# Patient Record
Sex: MALE | Race: WHITE | Employment: OTHER | URBAN - METROPOLITAN AREA
[De-identification: names, ages, dates, MRNs, and addresses within clinical notes are randomized per-mention and may not be internally consistent; named-entity substitution may affect disease eponyms.]

---

## 2019-11-10 LAB — FECAL BLOOD IMMUNOCHEMICAL TEST: NEGATIVE

## 2022-07-21 ENCOUNTER — TELEPHONE (OUTPATIENT)
Dept: ORTHOPEDIC SURGERY | Age: 77
End: 2022-07-21

## 2022-07-21 NOTE — TELEPHONE ENCOUNTER
Nae from Dr Sera Yuen office phoned requesting Dr. Love Marin see patient asap for L comminuted displaced radial head fx a month old. Instructed to forward medical and have patient drop off imaging for review. Verbalized understanding.

## 2022-07-26 ENCOUNTER — OFFICE VISIT (OUTPATIENT)
Dept: ORTHOPEDIC SURGERY | Age: 77
End: 2022-07-26
Payer: OTHER GOVERNMENT

## 2022-07-26 ENCOUNTER — TELEPHONE (OUTPATIENT)
Dept: ORTHOPEDIC SURGERY | Age: 77
End: 2022-07-26

## 2022-07-26 VITALS — HEIGHT: 69 IN | RESPIRATION RATE: 20 BRPM | WEIGHT: 212 LBS | BODY MASS INDEX: 31.4 KG/M2

## 2022-07-26 DIAGNOSIS — M79.602 LEFT ARM PAIN: Primary | ICD-10-CM

## 2022-07-26 DIAGNOSIS — S52.122A CLOSED DISPLACED FRACTURE OF HEAD OF LEFT RADIUS, INITIAL ENCOUNTER: ICD-10-CM

## 2022-07-26 DIAGNOSIS — S52.122A: ICD-10-CM

## 2022-07-26 DIAGNOSIS — S63.015A: ICD-10-CM

## 2022-07-26 PROCEDURE — 99204 OFFICE O/P NEW MOD 45 MIN: CPT | Performed by: ORTHOPAEDIC SURGERY

## 2022-07-26 PROCEDURE — 1123F ACP DISCUSS/DSCN MKR DOCD: CPT | Performed by: ORTHOPAEDIC SURGERY

## 2022-07-26 RX ORDER — ISOSORBIDE MONONITRATE 30 MG/1
30 TABLET, EXTENDED RELEASE ORAL EVERY MORNING
COMMUNITY

## 2022-07-26 NOTE — TELEPHONE ENCOUNTER
Left voicemail for patient that Dr. Jefe Castro would like to see him in the office today. Office phone number provided.

## 2022-07-26 NOTE — PROGRESS NOTES
Department of Orthopedic Surgery  History and Physical      CHIEF COMPLAINT:  left wrist and elbow pain    HISTORY OF PRESENT ILLNESS:                The patient is a RHD 68 y.o. male who presents with left wrist and elbow pain. States a month ago he was out of state when he tripped and fell down a flight of steps. He went to the hospital he was found to have a radial head fracture. He was placed into a splint. Is been in a splint for the last month. He did see Dr. Rosalee Del Castillo who referred him here for further evaluation. He is reporting a lot of wrist pain also. Past Medical History:        Diagnosis Date    Hyperlipidemia     Hypertension      Past Surgical History:        Procedure Laterality Date    CORONARY ARTERY BYPASS GRAFT       Current Medications:   No current facility-administered medications for this visit. Allergies:  Benadryl [diphenhydramine] and Penicillins    Social History:   TOBACCO:   reports that he has never smoked. He has never used smokeless tobacco.  ETOH:   has no history on file for alcohol use. DRUGS:   reports no history of drug use.   ACTIVITIES OF DAILY LIVING:    OCCUPATION:    Family History:       Problem Relation Age of Onset    Heart Attack Brother     Heart Surgery Brother     Pacemaker Sister     Heart Surgery Sister     Heart Surgery Sister        REVIEW OF SYSTEMS:  CONSTITUTIONAL:  negative  EYES:  negative  HEENT:  negative  RESPIRATORY:  negative  CARDIOVASCULAR:  negative  GASTROINTESTINAL:  negative  GENITOURINARY:  negative  INTEGUMENT/BREAST:  negative  HEMATOLOGIC/LYMPHATIC:  negative  ALLERGIC/IMMUNOLOGIC:  negative  ENDOCRINE:  negative  MUSCULOSKELETAL:  left elbow and wrist pain  NEUROLOGICAL:  negative  BEHAVIOR/PSYCH:  negative    PHYSICAL EXAM:    VITALS:  Resp 20   Ht 5' 9\" (1.753 m)   Wt 212 lb (96.2 kg)   BMI 31.31 kg/m²   CONSTITUTIONAL:  awake, alert, cooperative, no apparent distress, and appears stated age  EYES:  Lids and lashes normal, pupils equal, round and reactive to light, extra ocular muscles intact, sclera clear, conjunctiva normal  ENT:  Normocephalic, without obvious abnormality, atraumatic, sinuses nontender on palpation, external ears without lesions, oral pharynx with moist mucus membranes, tonsils without erythema or exudates, gums normal and good dentition. NECK:  Supple, symmetrical, trachea midline, no adenopathy, thyroid symmetric, not enlarged and no tenderness, skin normal  LUNGS:  CTA  CARDIOVASCULAR:  2+ radial pulses, extremities warm and well perfused  ABDOMEN: NTTP  CHEST:  Atraumatic   GENITAL/URINARY:  deferred  NEUROLOGIC:  Awake, alert, oriented to name, place and time. Cranial nerves II-XII are grossly intact. Motor is 5 out of 5 bilaterally. Sensory is intact.  gait is normal.  MUSCULOSKELETAL:    Left upper extremity: skin intact. Pain with rotation of the elbow and wrist. Stiffness with flexion and extension of the wrist.  Positive tenderness over the DRUJ. Positive tenderness over the forearm. Positive tenderness over the distal radius. Negative tenderness over the snuffbox. Median, ulnar, radial nerves intact light touch. Brisk capillary refill. Otherwise neurovascular intact. DATA:    CBC: No results found for: WBC, RBC, HGB, HCT, MCV, MCH, MCHC, RDW, PLT, MPV  PT/INR:  No results found for: PROTIME, INR    Radiology Review:  Xray: x-rays of the left elbow were reviewed from 7/21/22 2 views: AP/lateral : demonstrate comminuted, displaced radial head fracture  Impression: Comminuted, displaced radial head fracture    X-rays of the left wrist were reviewed from 7/21/2022: AP, lateral, oblique demonstrate no acute fracture dislocation. Impression: No acute fractures or dislocations    Xray: x-rays of the left forearm were obtained today in the office and reviewed with the patient.  2 views: AP, lateral: demonstrate no acute fracture or dislocation at the wrist.  There is a comminuted displaced radial out from his initial date of injury. Discussed complexity of the situation with him in detail. All questions answered. I concur with the findings and plan as documented.     Savanna Patricio MD  7/26/2022

## 2022-07-28 ENCOUNTER — PREP FOR PROCEDURE (OUTPATIENT)
Dept: ORTHOPEDIC SURGERY | Age: 77
End: 2022-07-28

## 2022-07-28 RX ORDER — SODIUM CHLORIDE 9 MG/ML
INJECTION, SOLUTION INTRAVENOUS PRN
Status: CANCELLED | OUTPATIENT
Start: 2022-07-28

## 2022-07-28 RX ORDER — SODIUM CHLORIDE 0.9 % (FLUSH) 0.9 %
5-40 SYRINGE (ML) INJECTION EVERY 12 HOURS SCHEDULED
Status: CANCELLED | OUTPATIENT
Start: 2022-07-28

## 2022-07-28 RX ORDER — SODIUM CHLORIDE 9 MG/ML
INJECTION, SOLUTION INTRAVENOUS CONTINUOUS
Status: CANCELLED | OUTPATIENT
Start: 2022-07-28

## 2022-07-28 RX ORDER — SODIUM CHLORIDE 0.9 % (FLUSH) 0.9 %
5-40 SYRINGE (ML) INJECTION PRN
Status: CANCELLED | OUTPATIENT
Start: 2022-07-28

## 2022-08-01 NOTE — PROGRESS NOTES
[facility] PRE-ADMISSION TESTING INSTRUCTIONS    The Preadmission Testing patient is instructed accordingly using the following criteria (check applicable):    ARRIVAL INSTRUCTIONS:  [x] Parking the day of Surgery is located in the Main Entrance lot. Upon entering the door, make an immediate right to the surgery reception desk    [x] Bring photo ID and insurance card    [x] Bring in a copy of Living will or Durable Power of  papers. [x] Please be sure to arrange for responsible adult to provide transportation to and from the hospital    [x] Please arrange for responsible adult to be with you for the 24 hour period post procedure due to having anesthesia    [x] If you awake am of surgery not feeling well or have temperature >100 please call [phone]    GENERAL INSTRUCTIONS:    [x] Nothing by mouth after midnight, including gum, candy, mints or water    [x] You may brush your teeth, but do not swallow any water    [x] Take medications as instructed with 1-2 oz of water    [x] Stop herbal supplements and vitamins 5 days prior to procedure    [x] Follow preop dosing of blood thinners per physician instructions    [x] Shower or bath with soap, lather and rinse well, AM of Surgery, no lotion, powders or creams to surgical site    [x] No tobacco products within 24 hours of surgery     [x] No alcohol or illegal drug use within 24 hours of surgery.     [x] Jewelry, body piercing's, eyeglasses, contact lenses and dentures are not permitted into surgery (bring cases)      [x] Please do not wear any nail polish, make up or hair products on the day of surgery    [x] You can expect a call the business day prior to procedure to notify you if your arrival time changes    [x] If you receive a survey after surgery we would greatly appreciate your comments    [] Pediatric patients may bring favorite toy, blanket or comfort item with them    [x] Please notify surgeon if you develop any illness between now and time of surgery (cold, cough, sore throat, fever, nausea, vomiting) or any signs of infections  including skin, wounds, and dental.    [x]  The Outpatient Pharmacy is available to fill your prescription here on your day of surgery, ask your preop nurse for details

## 2022-08-01 NOTE — DISCHARGE INSTRUCTIONS
DISCHARGE INSTRUCTIONS TO HOME FOLLOWING SURGERY    Please change your follow up next Thursday! ACTIVITY INSTRUCTIONS:    Elevate extremity to help reduce swelling. Use Purple Pillow for elevation of hand/arm   Use sling as needed. No heavy lifting, pushing, pulling or strenuous activity    WOUND/DRESSING INSTRUCTIONS:  Always ensure you and your care giver clean hands before and after caring for the wound. Keep the dressing, cast, or splint clean and dry until seen in office. Do not remove dressing   OK to shower- Keep your dressing dry. Can ice surgical region to help reduce swelling, Do not apply ice to fingers or toes       MEDICATION INSTRUCTIONS:  Take pain medicine as directed. When taking pain medications, you may experience dizziness or drowsiness. Do not drink alcohol or drive when taking these medications. Do not take any other medication containing acetaminophen (Tylenol) while taking the prescribed pain medication. Ibuprofen, Aleve, Motrin, or Naproxen are okay but should not be taken on an empty stomach. *Watch for these significant complications:  Call physician if these or any other problems occur:  Fever over 101°, redness, swelling or warmth at the operative site  Unrelieved nausea    Foul smelling or cloudy drainage at the operative site   Unrelieved pain  Breathing issues such as shortness of breath or difficulty breathing    Blood soaked dressing. (Some oozing may be normal)     Numb, pale, blue, cold or tingling extremity       Make an appointment to be seen next week!!!  FOLLOW-UP CARE:    Dr. Saida Cool.  [facility] [address] [place] [address]  [phone]

## 2022-08-03 ENCOUNTER — HOSPITAL ENCOUNTER (OUTPATIENT)
Age: 77
Setting detail: OUTPATIENT SURGERY
Discharge: HOME | End: 2022-08-03
Attending: ORTHOPAEDIC SURGERY | Admitting: ORTHOPAEDIC SURGERY
Payer: OTHER GOVERNMENT

## 2022-08-03 ENCOUNTER — ANESTHESIA (OUTPATIENT)
Dept: OPERATING ROOM | Age: 77
End: 2022-08-03
Payer: OTHER GOVERNMENT

## 2022-08-03 ENCOUNTER — HOSPITAL ENCOUNTER (OUTPATIENT)
Dept: GENERAL RADIOLOGY | Age: 77
Setting detail: OUTPATIENT SURGERY
Discharge: HOME | End: 2022-08-05
Attending: ORTHOPAEDIC SURGERY
Payer: OTHER GOVERNMENT

## 2022-08-03 ENCOUNTER — ANESTHESIA EVENT (OUTPATIENT)
Dept: OPERATING ROOM | Age: 77
End: 2022-08-03
Payer: OTHER GOVERNMENT

## 2022-08-03 VITALS
SYSTOLIC BLOOD PRESSURE: 156 MMHG | HEART RATE: 98 BPM | OXYGEN SATURATION: 95 % | HEIGHT: 70 IN | DIASTOLIC BLOOD PRESSURE: 88 MMHG | BODY MASS INDEX: 30.35 KG/M2 | WEIGHT: 212 LBS | TEMPERATURE: 98.4 F | RESPIRATION RATE: 16 BRPM

## 2022-08-03 DIAGNOSIS — R52 PAIN: ICD-10-CM

## 2022-08-03 DIAGNOSIS — G89.18 POST-OPERATIVE PAIN: Primary | ICD-10-CM

## 2022-08-03 DIAGNOSIS — S52.122A CLOSED DISPLACED FRACTURE OF HEAD OF LEFT RADIUS, INITIAL ENCOUNTER: Primary | ICD-10-CM

## 2022-08-03 LAB
ANION GAP SERPL CALCULATED.3IONS-SCNC: 12 MMOL/L (ref 7–16)
BUN BLDV-MCNC: 12 MG/DL (ref 6–23)
CALCIUM SERPL-MCNC: 9.7 MG/DL (ref 8.6–10.2)
CHLORIDE BLD-SCNC: 106 MMOL/L (ref 98–107)
CO2: 25 MMOL/L (ref 22–29)
CREAT SERPL-MCNC: 1.1 MG/DL (ref 0.7–1.2)
EKG ATRIAL RATE: 60 BPM
EKG P AXIS: 42 DEGREES
EKG P-R INTERVAL: 168 MS
EKG Q-T INTERVAL: 418 MS
EKG QRS DURATION: 102 MS
EKG QTC CALCULATION (BAZETT): 418 MS
EKG R AXIS: 5 DEGREES
EKG T AXIS: 25 DEGREES
EKG VENTRICULAR RATE: 60 BPM
GFR AFRICAN AMERICAN: >60
GFR NON-AFRICAN AMERICAN: >60 ML/MIN/1.73
GLUCOSE BLD-MCNC: 103 MG/DL (ref 74–99)
HCT VFR BLD CALC: 42.6 % (ref 37–54)
HEMOGLOBIN: 14 G/DL (ref 12.5–16.5)
MCH RBC QN AUTO: 31 PG (ref 26–35)
MCHC RBC AUTO-ENTMCNC: 32.9 % (ref 32–34.5)
MCV RBC AUTO: 94.2 FL (ref 80–99.9)
PDW BLD-RTO: 13 FL (ref 11.5–15)
PLATELET # BLD: 179 E9/L (ref 130–450)
PMV BLD AUTO: 10.4 FL (ref 7–12)
POTASSIUM SERPL-SCNC: 3.9 MMOL/L (ref 3.5–5)
RBC # BLD: 4.52 E12/L (ref 3.8–5.8)
SODIUM BLD-SCNC: 143 MMOL/L (ref 132–146)
WBC # BLD: 4.8 E9/L (ref 4.5–11.5)

## 2022-08-03 PROCEDURE — 6360000002 HC RX W HCPCS: Performed by: ANESTHESIOLOGY

## 2022-08-03 PROCEDURE — 7100000011 HC PHASE II RECOVERY - ADDTL 15 MIN: Performed by: ORTHOPAEDIC SURGERY

## 2022-08-03 PROCEDURE — 2709999900 HC NON-CHARGEABLE SUPPLY: Performed by: ORTHOPAEDIC SURGERY

## 2022-08-03 PROCEDURE — 85027 COMPLETE CBC AUTOMATED: CPT

## 2022-08-03 PROCEDURE — 3700000001 HC ADD 15 MINUTES (ANESTHESIA): Performed by: ORTHOPAEDIC SURGERY

## 2022-08-03 PROCEDURE — 6370000000 HC RX 637 (ALT 250 FOR IP): Performed by: ANESTHESIOLOGY

## 2022-08-03 PROCEDURE — 3600000003 HC SURGERY LEVEL 3 BASE: Performed by: ORTHOPAEDIC SURGERY

## 2022-08-03 PROCEDURE — C1776 JOINT DEVICE (IMPLANTABLE): HCPCS | Performed by: ORTHOPAEDIC SURGERY

## 2022-08-03 PROCEDURE — 80048 BASIC METABOLIC PNL TOTAL CA: CPT

## 2022-08-03 PROCEDURE — C1713 ANCHOR/SCREW BN/BN,TIS/BN: HCPCS | Performed by: ORTHOPAEDIC SURGERY

## 2022-08-03 PROCEDURE — 7100000001 HC PACU RECOVERY - ADDTL 15 MIN: Performed by: ORTHOPAEDIC SURGERY

## 2022-08-03 PROCEDURE — 3700000000 HC ANESTHESIA ATTENDED CARE: Performed by: ORTHOPAEDIC SURGERY

## 2022-08-03 PROCEDURE — 24343 REPR ELBOW LAT LIGMNT W/TISS: CPT | Performed by: ORTHOPAEDIC SURGERY

## 2022-08-03 PROCEDURE — 7100000010 HC PHASE II RECOVERY - FIRST 15 MIN: Performed by: ORTHOPAEDIC SURGERY

## 2022-08-03 PROCEDURE — 6360000002 HC RX W HCPCS: Performed by: NURSE ANESTHETIST, CERTIFIED REGISTERED

## 2022-08-03 PROCEDURE — 93005 ELECTROCARDIOGRAM TRACING: CPT

## 2022-08-03 PROCEDURE — 2580000003 HC RX 258: Performed by: NURSE ANESTHETIST, CERTIFIED REGISTERED

## 2022-08-03 PROCEDURE — 6360000002 HC RX W HCPCS: Performed by: PHYSICIAN ASSISTANT

## 2022-08-03 PROCEDURE — 2500000003 HC RX 250 WO HCPCS: Performed by: ORTHOPAEDIC SURGERY

## 2022-08-03 PROCEDURE — 94664 DEMO&/EVAL PT USE INHALER: CPT

## 2022-08-03 PROCEDURE — 3209999900 FLUORO FOR SURGICAL PROCEDURES

## 2022-08-03 PROCEDURE — 7100000000 HC PACU RECOVERY - FIRST 15 MIN: Performed by: ORTHOPAEDIC SURGERY

## 2022-08-03 PROCEDURE — 2500000003 HC RX 250 WO HCPCS: Performed by: NURSE ANESTHETIST, CERTIFIED REGISTERED

## 2022-08-03 PROCEDURE — 24666 OPTX RADIAL HEAD/NCK FX RPLC: CPT | Performed by: ORTHOPAEDIC SURGERY

## 2022-08-03 PROCEDURE — 3600000013 HC SURGERY LEVEL 3 ADDTL 15MIN: Performed by: ORTHOPAEDIC SURGERY

## 2022-08-03 PROCEDURE — 2580000003 HC RX 258: Performed by: PHYSICIAN ASSISTANT

## 2022-08-03 PROCEDURE — 36415 COLL VENOUS BLD VENIPUNCTURE: CPT

## 2022-08-03 PROCEDURE — 24670 CLTX ULNAR FX PROX W/O MNPJ: CPT | Performed by: ORTHOPAEDIC SURGERY

## 2022-08-03 DEVICE — IMPLANTABLE DEVICE
Type: IMPLANTABLE DEVICE | Site: ARM | Status: FUNCTIONAL
Brand: EVOLVE

## 2022-08-03 DEVICE — ANCHOR SUT 2 ORTHOCORD L36IN VLT BLU COMP BRAID SUT CP-2: Type: IMPLANTABLE DEVICE | Site: ARM | Status: FUNCTIONAL

## 2022-08-03 RX ORDER — OXYCODONE HYDROCHLORIDE AND ACETAMINOPHEN 5; 325 MG/1; MG/1
1 TABLET ORAL EVERY 6 HOURS PRN
Qty: 28 TABLET | Refills: 0 | Status: SHIPPED | OUTPATIENT
Start: 2022-08-03 | End: 2022-08-10

## 2022-08-03 RX ORDER — ONDANSETRON 2 MG/ML
INJECTION INTRAMUSCULAR; INTRAVENOUS PRN
Status: DISCONTINUED | OUTPATIENT
Start: 2022-08-03 | End: 2022-08-03 | Stop reason: SDUPTHER

## 2022-08-03 RX ORDER — SODIUM CHLORIDE 9 MG/ML
INJECTION, SOLUTION INTRAVENOUS PRN
Status: DISCONTINUED | OUTPATIENT
Start: 2022-08-03 | End: 2022-08-03 | Stop reason: HOSPADM

## 2022-08-03 RX ORDER — SODIUM CHLORIDE 9 MG/ML
INJECTION, SOLUTION INTRAVENOUS CONTINUOUS PRN
Status: DISCONTINUED | OUTPATIENT
Start: 2022-08-03 | End: 2022-08-03 | Stop reason: SDUPTHER

## 2022-08-03 RX ORDER — ALBUTEROL SULFATE 2.5 MG/3ML
2.5 SOLUTION RESPIRATORY (INHALATION) ONCE
Status: COMPLETED | OUTPATIENT
Start: 2022-08-03 | End: 2022-08-03

## 2022-08-03 RX ORDER — LIDOCAINE HYDROCHLORIDE 20 MG/ML
INJECTION, SOLUTION EPIDURAL; INFILTRATION; INTRACAUDAL; PERINEURAL PRN
Status: DISCONTINUED | OUTPATIENT
Start: 2022-08-03 | End: 2022-08-03 | Stop reason: SDUPTHER

## 2022-08-03 RX ORDER — FENTANYL CITRATE 50 UG/ML
INJECTION, SOLUTION INTRAMUSCULAR; INTRAVENOUS PRN
Status: DISCONTINUED | OUTPATIENT
Start: 2022-08-03 | End: 2022-08-03 | Stop reason: SDUPTHER

## 2022-08-03 RX ORDER — SODIUM CHLORIDE 0.9 % (FLUSH) 0.9 %
5-40 SYRINGE (ML) INJECTION EVERY 12 HOURS SCHEDULED
Status: DISCONTINUED | OUTPATIENT
Start: 2022-08-03 | End: 2022-08-03 | Stop reason: HOSPADM

## 2022-08-03 RX ORDER — BUPIVACAINE HYDROCHLORIDE AND EPINEPHRINE 5; 5 MG/ML; UG/ML
INJECTION, SOLUTION EPIDURAL; INTRACAUDAL; PERINEURAL PRN
Status: DISCONTINUED | OUTPATIENT
Start: 2022-08-03 | End: 2022-08-03 | Stop reason: ALTCHOICE

## 2022-08-03 RX ORDER — PROCHLORPERAZINE EDISYLATE 5 MG/ML
5 INJECTION INTRAMUSCULAR; INTRAVENOUS
Status: DISCONTINUED | OUTPATIENT
Start: 2022-08-03 | End: 2022-08-03 | Stop reason: HOSPADM

## 2022-08-03 RX ORDER — OXYCODONE HYDROCHLORIDE AND ACETAMINOPHEN 5; 325 MG/1; MG/1
1 TABLET ORAL ONCE
Status: COMPLETED | OUTPATIENT
Start: 2022-08-03 | End: 2022-08-03

## 2022-08-03 RX ORDER — DEXAMETHASONE SODIUM PHOSPHATE 4 MG/ML
INJECTION, SOLUTION INTRA-ARTICULAR; INTRALESIONAL; INTRAMUSCULAR; INTRAVENOUS; SOFT TISSUE PRN
Status: DISCONTINUED | OUTPATIENT
Start: 2022-08-03 | End: 2022-08-03 | Stop reason: SDUPTHER

## 2022-08-03 RX ORDER — SODIUM CHLORIDE 0.9 % (FLUSH) 0.9 %
5-40 SYRINGE (ML) INJECTION PRN
Status: DISCONTINUED | OUTPATIENT
Start: 2022-08-03 | End: 2022-08-03 | Stop reason: HOSPADM

## 2022-08-03 RX ORDER — SODIUM CHLORIDE 9 MG/ML
INJECTION, SOLUTION INTRAVENOUS CONTINUOUS
Status: DISCONTINUED | OUTPATIENT
Start: 2022-08-03 | End: 2022-08-03 | Stop reason: HOSPADM

## 2022-08-03 RX ORDER — PROPOFOL 10 MG/ML
INJECTION, EMULSION INTRAVENOUS PRN
Status: DISCONTINUED | OUTPATIENT
Start: 2022-08-03 | End: 2022-08-03 | Stop reason: SDUPTHER

## 2022-08-03 RX ORDER — MEPERIDINE HYDROCHLORIDE 25 MG/ML
12.5 INJECTION INTRAMUSCULAR; INTRAVENOUS; SUBCUTANEOUS ONCE
Status: COMPLETED | OUTPATIENT
Start: 2022-08-03 | End: 2022-08-03

## 2022-08-03 RX ADMIN — ALBUTEROL SULFATE 2.5 MG: 2.5 SOLUTION RESPIRATORY (INHALATION) at 11:59

## 2022-08-03 RX ADMIN — DEXAMETHASONE SODIUM PHOSPHATE 10 MG: 4 INJECTION, SOLUTION INTRAMUSCULAR; INTRAVENOUS at 09:22

## 2022-08-03 RX ADMIN — SODIUM CHLORIDE: 9 INJECTION, SOLUTION INTRAVENOUS at 09:11

## 2022-08-03 RX ADMIN — OXYCODONE AND ACETAMINOPHEN 1 TABLET: 5; 325 TABLET ORAL at 12:12

## 2022-08-03 RX ADMIN — MEPERIDINE HYDROCHLORIDE 12.5 MG: 25 INJECTION, SOLUTION INTRAMUSCULAR; INTRAVENOUS; SUBCUTANEOUS at 10:50

## 2022-08-03 RX ADMIN — PROPOFOL 200 MG: 10 INJECTION, EMULSION INTRAVENOUS at 09:16

## 2022-08-03 RX ADMIN — FENTANYL CITRATE 100 MCG: 50 INJECTION, SOLUTION INTRAMUSCULAR; INTRAVENOUS at 09:16

## 2022-08-03 RX ADMIN — LIDOCAINE HYDROCHLORIDE 100 MG: 20 INJECTION, SOLUTION EPIDURAL; INFILTRATION; INTRACAUDAL; PERINEURAL at 09:16

## 2022-08-03 RX ADMIN — WATER 2000 MG: 1 INJECTION INTRAMUSCULAR; INTRAVENOUS; SUBCUTANEOUS at 09:11

## 2022-08-03 RX ADMIN — HYDROMORPHONE HYDROCHLORIDE 0.5 MG: 1 INJECTION, SOLUTION INTRAMUSCULAR; INTRAVENOUS; SUBCUTANEOUS at 11:03

## 2022-08-03 RX ADMIN — HYDROMORPHONE HYDROCHLORIDE 0.5 MG: 1 INJECTION, SOLUTION INTRAMUSCULAR; INTRAVENOUS; SUBCUTANEOUS at 11:08

## 2022-08-03 RX ADMIN — ONDANSETRON 4 MG: 2 INJECTION INTRAMUSCULAR; INTRAVENOUS at 09:22

## 2022-08-03 ASSESSMENT — PAIN - FUNCTIONAL ASSESSMENT
PAIN_FUNCTIONAL_ASSESSMENT: ACTIVITIES ARE NOT PREVENTED
PAIN_FUNCTIONAL_ASSESSMENT: 0-10

## 2022-08-03 ASSESSMENT — PAIN DESCRIPTION - ORIENTATION
ORIENTATION: LEFT

## 2022-08-03 ASSESSMENT — PAIN DESCRIPTION - LOCATION
LOCATION: ELBOW
LOCATION: ARM
LOCATION: ELBOW
LOCATION: ARM
LOCATION: ARM;HAND
LOCATION: ELBOW
LOCATION: ARM
LOCATION: ARM
LOCATION: ELBOW

## 2022-08-03 ASSESSMENT — PAIN DESCRIPTION - DESCRIPTORS
DESCRIPTORS: THROBBING
DESCRIPTORS: THROBBING
DESCRIPTORS: DISCOMFORT
DESCRIPTORS: THROBBING
DESCRIPTORS: DISCOMFORT
DESCRIPTORS: SHARP;THROBBING
DESCRIPTORS: DISCOMFORT
DESCRIPTORS: DULL;THROBBING

## 2022-08-03 ASSESSMENT — PAIN DESCRIPTION - ONSET
ONSET: ON-GOING

## 2022-08-03 ASSESSMENT — PAIN DESCRIPTION - PAIN TYPE
TYPE: SURGICAL PAIN

## 2022-08-03 ASSESSMENT — PAIN DESCRIPTION - FREQUENCY
FREQUENCY: CONTINUOUS

## 2022-08-03 ASSESSMENT — PAIN SCALES - GENERAL
PAINLEVEL_OUTOF10: 10
PAINLEVEL_OUTOF10: 4
PAINLEVEL_OUTOF10: 7
PAINLEVEL_OUTOF10: 4
PAINLEVEL_OUTOF10: 5
PAINLEVEL_OUTOF10: 0
PAINLEVEL_OUTOF10: 5
PAINLEVEL_OUTOF10: 5
PAINLEVEL_OUTOF10: 8
PAINLEVEL_OUTOF10: 6
PAINLEVEL_OUTOF10: 6

## 2022-08-03 ASSESSMENT — PAIN SCALES - WONG BAKER: WONGBAKER_NUMERICALRESPONSE: 2

## 2022-08-03 NOTE — ANESTHESIA POSTPROCEDURE EVALUATION
Department of Anesthesiology  Postprocedure Note    Patient: Tierra Mcqueen  MRN: [id number]  YOB: 1945  Date of evaluation: 8/3/2022      Procedure Summary     Date: 08/03/22 Room / Location: [facility] OR 05 / [facility]    Anesthesia Start: 2106 Anesthesia Stop: 1042    Procedure: LEFT RADIAL HEAD ARTHROPLASTY WITH LIGAMENT REPAIR RADIAL ULNAR JOINT (Left: Arm Upper) Diagnosis:       Closed nondisplaced fracture of head of left radius, initial encounter      Dislocation of distal radioulnar joint of left wrist, initial encounter      (Closed nondisplaced fracture of head of left radius, initial encounter [S52.125A])      (Dislocation of distal radioulnar joint of left wrist, initial encounter [S38.846W])    Surgeons: Crissy Robles MD Responsible Provider: Adelaida Sanabria MD    Anesthesia Type: general ASA Status: 3          Anesthesia Type: No value filed.     Joceline Phase I: Joceline Score: 10    Joceline Phase II:        Anesthesia Post Evaluation    Patient location during evaluation: PACU  Patient participation: complete - patient participated  Level of consciousness: awake and alert  Pain score: 2  Airway patency: patent  Nausea & Vomiting: no nausea and no vomiting  Complications: no  Cardiovascular status: hemodynamically stable  Respiratory status: acceptable

## 2022-08-03 NOTE — BRIEF OP NOTE
Brief Postoperative Note      Patient: Stephan John  YOB: 1945  MRN: [id number]    Date of Procedure: 8/3/2022    Pre-Op Diagnosis: Closed nondisplaced fracture of head of left radius, initial encounter [S52.125A]  Dislocation of distal radioulnar joint of left wrist, initial encounter [Y99.197X]    Post-Op Diagnosis: Same       Procedure(s):  LEFT RADIAL HEAD ARTHROPLASTY WITH LIGAMENT REPAIR RADIAL ULNAR JOINT    Surgeon(s):  Yoanna Braun MD    Assistant:  Physician Assistant: DAVIDE Wilcox  Resident: Rossy Paredes DO    Anesthesia: General    Estimated Blood Loss (mL): Minimal    Complications: None    Specimens:   * No specimens in log *    Implants:  Implant Name Type Inv.  Item Serial No.  Lot No. LRB No. Used Action   HEAD RAD [id number] +4MM OFFSET CO CHROM STD BILAT PRSS FIT - [id number]  HEAD RAD [id number] +4MM OFFSET CO CHROM STD BILAT PRSS FIT  [employer] exsulin [facility]- [id number] Left 1 Implanted   STEM FNGR JT SZ 0 PROX INTERPHALANGEAL ROBERTO CARLOS [facility] - [id number]  STEM FNGR JT SZ 0 PROX INTERPHALANGEAL ROBERTO CARLOS [facility] exsulin [facility]- [id number] Left 1 Implanted   ANCHOR SUT 2 ORTHOCORD L36IN VLT NICHOLAS COMP BRAID SUT CP-2 - [id number]  ANCHOR SUT 2 ORTHOCORD L36IN VLT NICHOLAS COMP BRAID SUT CP-2  [employer]- [id number] Left 1 Implanted         Drains: * No LDAs found *    Findings: see op note    Electronically signed by DAVIDE Wilcox on 8/3/2022 at 10:41 AM

## 2022-08-03 NOTE — H&P
Department of Orthopedic Surgery  History and Physical      CHIEF COMPLAINT:  left wrist and elbow pain    HISTORY OF PRESENT ILLNESS:                The patient is a RHD 68 y.o. male who presents with left wrist and elbow pain. States a month ago he was out of state when he tripped and fell down a flight of steps. He went to the hospital he was found to have a radial head fracture. He was placed into a splint. Is been in a splint for the last month. He did see Dr. Sarah Menendez who referred him here for further evaluation. He is reporting a lot of wrist pain also. Past Medical History:        Diagnosis Date    Arthritis     CAD (coronary artery disease) 2017    Cardiogist Dr Ruel Joseph in [place] / last checkup 6/2022 - pt states no change in care at this visit    Fall 06/28/2022    fell down steps - pt states he was in the dark in an unfamiliar house - no complaints of dizziness prior to fall    Hyperlipidemia     Hypertension     Left radial head fracture 06/28/2022    fell down steps    GAGE on CPAP     pt does not know the setting     Past Surgical History:        Procedure Laterality Date    COLONOSCOPY      CORONARY ARTERY BYPASS GRAFT  2017    At [facility]     Current Medications:   Current Facility-Administered Medications: 0.9 % sodium chloride infusion, , IntraVENous, Continuous  sodium chloride flush 0.9 % injection 5-40 mL, 5-40 mL, IntraVENous, 2 times per day  sodium chloride flush 0.9 % injection 5-40 mL, 5-40 mL, IntraVENous, PRN  0.9 % sodium chloride infusion, , IntraVENous, PRN  ceFAZolin (ANCEF) 2,000 mg in sterile water 20 mL IV syringe, 2,000 mg, IntraVENous, On Call to OR  Allergies:  Benadryl [diphenhydramine] and Penicillins    Social History:   TOBACCO:   reports that he has never smoked. He has never used smokeless tobacco.  ETOH:   reports no history of alcohol use. DRUGS:   has no history on file for drug use.   ACTIVITIES OF DAILY LIVING:    OCCUPATION:    Family History:       Problem Relation Age of Onset    Heart Attack Brother     Heart Surgery Brother     Pacemaker Sister     Heart Surgery Sister     Heart Surgery Sister        REVIEW OF SYSTEMS:  CONSTITUTIONAL:  negative  EYES:  negative  HEENT:  negative  RESPIRATORY:  negative  CARDIOVASCULAR:  negative  GASTROINTESTINAL:  negative  GENITOURINARY:  negative  INTEGUMENT/BREAST:  negative  HEMATOLOGIC/LYMPHATIC:  negative  ALLERGIC/IMMUNOLOGIC:  negative  ENDOCRINE:  negative  MUSCULOSKELETAL:  left elbow and wrist pain  NEUROLOGICAL:  negative  BEHAVIOR/PSYCH:  negative    PHYSICAL EXAM:    VITALS:  BP (!) 146/81   Pulse 69   Temp 97.1 °F (36.2 °C)   Resp 18   Ht 5' 9.5\" (1.765 m)   Wt 212 lb (96.2 kg)   SpO2 95%   BMI 30.86 kg/m²   CONSTITUTIONAL:  awake, alert, cooperative, no apparent distress, and appears stated age  EYES:  Lids and lashes normal, pupils equal, round and reactive to light, extra ocular muscles intact, sclera clear, conjunctiva normal  ENT:  Normocephalic, without obvious abnormality, atraumatic, sinuses nontender on palpation, external ears without lesions, oral pharynx with moist mucus membranes, tonsils without erythema or exudates, gums normal and good dentition. NECK:  Supple, symmetrical, trachea midline, no adenopathy, thyroid symmetric, not enlarged and no tenderness, skin normal  LUNGS:  CTA  CARDIOVASCULAR:  2+ radial pulses, extremities warm and well perfused  ABDOMEN: NTTP  CHEST:  Atraumatic   GENITAL/URINARY:  deferred  NEUROLOGIC:  Awake, alert, oriented to name, place and time. Cranial nerves II-XII are grossly intact. Motor is 5 out of 5 bilaterally. Sensory is intact.  gait is normal.  MUSCULOSKELETAL:    Left upper extremity: skin intact. Pain with rotation of the elbow and wrist. Stiffness with flexion and extension of the wrist.  Positive tenderness over the DRUJ. Positive tenderness over the forearm. Positive tenderness over the distal radius.   Negative tenderness over the snuffbox. Median, ulnar, radial nerves intact light touch. Brisk capillary refill. Otherwise neurovascular intact. DATA:    CBC: No results found for: WBC, RBC, HGB, HCT, MCV, MCH, MCHC, RDW, PLT, MPV  PT/INR:  No results found for: PROTIME, INR    Radiology Review:  Xray: x-rays of the left elbow were reviewed from 7/21/22 2 views: AP/lateral : demonstrate comminuted, displaced radial head fracture  Impression: Comminuted, displaced radial head fracture    X-rays of the left wrist were reviewed from 7/21/2022: AP, lateral, oblique demonstrate no acute fracture dislocation. Impression: No acute fractures or dislocations    Xray: x-rays of the left forearm were obtained today in the office and reviewed with the patient. 2 views: AP, lateral: demonstrate no acute fracture or dislocation at the wrist.  There is a comminuted displaced radial head fracture  Impression: Comminuted displaced radial head fracture      IMPRESSION:  Closed, Left comminuted radial head fracture  Left wrist pain  HTN  Hyperlipidemia    PLAN:  Discussed findings with the patient at today's visit. Discussed conservative and surgical management with the patient. Recommended surgical management. Did discuss he has a lot of pain in his wrist at today's visit. There is concern that he injured his ligament between the radius and ulna. We will plan for a left radial head ORIF versus arthroplasty with ligament repairs and left wrist DRUJ repairs as needed. Postoperative course explained the patient. Patient like to proceed. All questions answered.     I explained the risks, benefits, alternatives and complications of surgery with the patient including but not limited to the risks of death, possible damage to nerves, vessels, or tendons, possible infection, possible nonunion, possible malunion, possible hardware failure, possible need for hardware removal, stiffness, as well as the possible need further surgery and unanticipated complications. The patient voiced understanding and all questions were answered. The patient elected to proceed with surgical intervention. The patient was counseled at length about the risks of sundeep Covid-19 during their perioperative period and any recovery window from their procedure. The patient was made aware that sundeep Covid-19  may worsen their prognosis for recovering from their procedure  and lend to a higher morbidity and/or mortality risk. All material risks, benefits, and reasonable alternatives including postponing the procedure were discussed. The patient does wish to proceed with the procedure at this time.      Philly Watts,   8/3/2022

## 2022-08-03 NOTE — ANESTHESIA PRE PROCEDURE
Department of Anesthesiology  Preprocedure Note       Name:  Marc Good   Age:  68 y.o.  :  1945                                          MRN:  [id number]         Date:  8/3/2022      Surgeon: Kevan Regalado):  Karon Stauffer MD    Procedure: Procedure(s):  LEFT RADIAL HEAD OPEN REDUCTION INTERNAL FIXATION VS ARTHROPLASTY WITH LIGAMENT REPAIR POSSIBLE REPAIR DISTAL RADIAL ULNAR JOINT   +++SYNTHES+++   ++BLOOM MEDICAL++    Medications prior to admission:   Prior to Admission medications    Medication Sig Start Date End Date Taking? Authorizing Provider   METOPROLOL SUCCINATE PO Take 25 mg by mouth at bedtime   Yes Historical Provider, MD   Multiple Vitamin (MULTIVITAMIN ADULT PO) Take by mouth daily   Yes Historical Provider, MD   isosorbide mononitrate (IMDUR) 30 MG extended release tablet Take 30 mg by mouth every morning    Historical Provider, MD   atorvastatin (LIPITOR) 20 MG tablet Take 40 mg by mouth nightly    Historical Provider, MD   Cholecalciferol (VITAMIN D) 2000 units CAPS capsule Take by mouth    Historical Provider, MD   aspirin 81 MG chewable tablet Take 81 mg by mouth daily  Patient not taking: No sig reported    Historical Provider, MD       Current medications:    Current Facility-Administered Medications   Medication Dose Route Frequency Provider Last Rate Last Admin    0.9 % sodium chloride infusion   IntraVENous Continuous DAVIDE Valero        sodium chloride flush 0.9 % injection 5-40 mL  5-40 mL IntraVENous 2 times per day DAVIDE Valero        sodium chloride flush 0.9 % injection 5-40 mL  5-40 mL IntraVENous PRN DAVIDE Valero        0.9 % sodium chloride infusion   IntraVENous PRN DAVIDE Valero        ceFAZolin (ANCEF) 2,000 mg in sterile water 20 mL IV syringe  2,000 mg IntraVENous On Call to [address]           Allergies:     Allergies   Allergen Reactions    Benadryl [Diphenhydramine] Rash    Penicillins Rash       Problem List:  There is no problem list on file for this patient. Past Medical History:        Diagnosis Date    Arthritis     CAD (coronary artery disease) 2017    Cardiogist Dr Jayant Edmond in [place] / last checkup 6/2022 - pt states no change in care at this visit    Fall 06/28/2022    fell down steps - pt states he was in the dark in an unfamiliar house - no complaints of dizziness prior to fall    Hyperlipidemia     Hypertension     Left radial head fracture 06/28/2022    fell down steps    GAGE on CPAP     pt does not know the setting       Past Surgical History:        Procedure Laterality Date    COLONOSCOPY      CORONARY ARTERY BYPASS GRAFT  2017    At [facility]       Social History:    Social History     Tobacco Use    Smoking status: Never    Smokeless tobacco: Never   Substance Use Topics    Alcohol use: Never                                Counseling given: Not Answered      Vital Signs (Current):   Vitals:    08/01/22 1450 08/03/22 0753   BP:  (!) 146/81   Pulse:  69   Resp:  18   Temp:  97.1 °F (36.2 °C)   SpO2:  95%   Weight: 212 lb (96.2 kg) 212 lb (96.2 kg)   Height: 5' 9.5\" (1.765 m) 5' 9.5\" (1.765 m)                                              BP Readings from Last 3 Encounters:   08/03/22 (!) 146/81   10/25/17 131/80       NPO Status:                                                                                 BMI:   Wt Readings from Last 3 Encounters:   08/03/22 212 lb (96.2 kg)   07/26/22 212 lb (96.2 kg)   11/06/17 216 lb 9.6 oz (98.2 kg)     Body mass index is 30.86 kg/m². CBC: No results found for: WBC, RBC, HGB, HCT, MCV, RDW, PLT    CMP: No results found for: NA, K, CL, CO2, BUN, CREATININE, GFRAA, AGRATIO, LABGLOM, GLUCOSE, GLU, PROT, CALCIUM, BILITOT, ALKPHOS, AST, ALT    POC Tests: No results for input(s): POCGLU, POCNA, POCK, POCCL, POCBUN, POCHEMO, POCHCT in the last 72 hours.     Coags: No results found for: PROTIME, INR, APTT    HCG (If Applicable): No results found for: PREGTESTUR, PREGSERUM, HCG, HCGQUANT     ABGs: No results found for: PHART, PO2ART, HOI0MRT, ODC4BYU, BEART, V0JNRLEG     Type & Screen (If Applicable):  No results found for: LABABO, LABRH    Drug/Infectious Status (If Applicable):  No results found for: HIV, HEPCAB    COVID-19 Screening (If Applicable): No results found for: COVID19        Anesthesia Evaluation  Patient summary reviewed and Nursing notes reviewed no history of anesthetic complications:   Airway: Mallampati: III  TM distance: >3 FB   Neck ROM: full  Mouth opening: > = 3 FB   Dental:          Pulmonary: breath sounds clear to auscultation  (+) sleep apnea: on CPAP,                             Cardiovascular:  Exercise tolerance: good (>4 METS),   (+) hypertension:, CAD:, CABG/stent: no interval change,         Rhythm: regular  Rate: normal                 ROS comment: Saw his cardiologist June 2022 - no changes - follow up in 1 year     Neuro/Psych:   Negative Neuro/Psych ROS              GI/Hepatic/Renal: Neg GI/Hepatic/Renal ROS            Endo/Other: Negative Endo/Other ROS                    Abdominal:             Vascular: negative vascular ROS. Other Findings:           Anesthesia Plan      general     ASA 3       Induction: intravenous. MIPS: Postoperative opioids intended and Prophylactic antiemetics administered. Anesthetic plan and risks discussed with patient. Plan discussed with CRNA. Steve Pike MD   8/3/2022          Patient seen and chart reviewed. No interval change in history or exam.   Anesthesia plan discussed, risk/benefits addressed. Patient's concerns and questions answered.      SAM Sawyer - CRNA  August 3, 2022  8:25 AM        Orville Null MD

## 2022-08-03 NOTE — PROGRESS NOTES
CLINICAL PHARMACY NOTE: MEDS TO BEDS    Total # of Prescriptions Filled: 1   The following medications were delivered to the patient:  Percocet 5-325 mg    Additional Documentation:

## 2022-08-04 NOTE — OP NOTE
[address] [address]                                OPERATIVE REPORT    PATIENT NAME: Froy Longo                      :        1945  MED REC NO:   [id number]                            ROOM:  ACCOUNT NO:   [de-identified]                           ADMIT DATE: 2022  PROVIDER:     Adina Worley MD    DATE OF PROCEDURE:  2022    PREOPERATIVE DIAGNOSES:  Left closed displaced comminuted radial head  fracture and lateral ulnar collateral ligament injury. POSTOPERATIVE DIAGNOSES:  1. Left comminuted displaced radial head fracture. 2.  Left type 2 coronoid fracture. 3.  Lateral ulnar collateral ligamentous complex. 4.  Left elbow terrible triad fracture. PROCEDURES PERFORMED:  1. Left radial head arthroplasty using the [facility] size +2,  8.5-mm stem with a size +4, 24-mm radial head. 2.  Nonoperative management of type 2 coronoid fracture. 3.  Left elbow lateral ulnar collateral ligament repair. 4.  Use of intraoperative fluoroscopy with a radial pull test negative  for forearm axis instability. 5.  Lateral ulnar collateral ligament repair using the Mitek anchor. SURGEON:  Adina Worley M.D. ANESTHESIA:  1. General.  2.  Local anesthetic by surgeon consisting of approximately 20 mL of  0.25% Marcaine with epinephrine. ASSISTANTS:  1. Joan Pablo DO, orthopedic surgery resident. 2.  Jaime Miller physician assistant certified. She was present  throughout the procedure. Her assistance was used for preoperative  positioning, intraoperative retraction, closure, and dressing  application. Her assistance expedited the case and decreased the  surgical time. TOTAL TOURNIQUET TIME:  Approximately 39 minutes. ESTIMATED BLOOD LOSS:  Minimal.    FINDINGS:  1. Severely comminuted and incarcerated radial head with articular  depression.   2.  There was significant stiffness about the elbow. This was improved  with gentle range of motion of the elbow and joint debridement. 3.  Intraoperative radial pull test, status post radial head resection,  revealed no forearm axis instability. 4.  There was compromise of the lateral ulnar collateral ligament that  was amenable to repair with a Mitek Super Perry. 5.  Status post radial head arthroplasty and ligament repair,  intraoperative fluoroscopy was used to confirm excellent reduction and  stability to the radiocapitellar joint and ulnar humeral joint without  significant narrowing of the medial clear space on the AP view and  excellent alignment and capture of the radiocapitellar joint and  alignment with full elbow range of motion. COMPLICATIONS:  None. SPECIMEN:  Radial head was sent for gross pathology. DISPOSITION:  The patient remained stable throughout the procedure. OPERATIVE INDICATIONS:  The patient is a 44-year-old gentleman who  sustained a left radial head injury, presented to the office in a  delayed fashion with persistent elbow pain and stiffness and a  comminuted radial head fracture. The severity of his injury was  explained. Risks, benefits, alternatives, and complication of surgery  were explained. After discussion, he wished to proceed with surgical  intervention. Risks included, but not limited to the risk of infection,  damage to nerves, vessels, or tendons, failure to improve his symptoms  or worsening of symptoms, recurrence of symptoms, persistent stiffness,  need for therapy, and possible need for additional surgery as well as  unforeseen complications. He voiced understanding and wished to proceed  with surgical intervention. DESCRIPTION OF PROCEDURE:  The patient was identified in the holding  area. The elbow was identified as the surgical site.   He was then seen  by Anesthesia, taken to the operating room, placed supine on the  operating table, and underwent general anesthesia per Anesthesia  Department. All bony prominences were well padded. A well-padded arm  tourniquet was placed. The left upper extremity was prepared and draped  in the standard sterile fashion. Arm was exsanguinated. Tourniquet  inflated to 250 mmHg. Total tourniquet time was approximately 39  minutes. Fluoroscopy was then brought in and used to confirm a comminuted radial  head fracture. A direct lateral approach to the elbow was undertaken centered on  lateral epicondyle extending distally in the mid axis of the radius and  proximally alone the lateral epicondylar ridge. Dissection was carried  down to the fascia with hemostasis achieved with electrocautery. Incision was then made along the lateral epicondylar ridge reflecting  the anterior soft tissues anteriorly. A large hematoma was encountered. Dissection was performed distally with forearm in pronation dividing the  lateral soft tissues in the mid axis. A comminuted radial head fracture  was encountered. This was completely displaced. There was comminution  and impaction of the fracture site and the head was completely  disassociated. A large head fragment was removed confirming significant  articular impaction, which precluded direct anatomic repair. Therefore,  this head piece was removed. Soft tissue protection was placed around  the neck of the radius and the radius as re-cut, flushed just distal to  the proximal radioulnar joint. With this completed, sequential  broaching was undertaken for right radial head arthroplasty. This was  taken up to a size 8.5.  A +2 extension was selected based on the  fracture pattern and a +4, 24-mm diameter head, as 26 was too large, +2  offset was trialed first _____ +4. This provided excellent capture of  the joint line on the AP view without overstuffing the medial joint  space. On the lateral view, the radiocapitellar joint was nicely  aligned. This was removed.   Fluoroscopy was then placed at the wrist  and a gentle radial pull test revealed no forearm axis instability. Therefore, the wound was copiously irrigated out. The wound was further inspected and there was found to be a type 2  coronoid fracture. This was a little bit more than a type 1, but did  involve the articular surface and felt to be more consistent with type  2, but did not provide any significant instability. This fracture was  gently debrided, but was not fixated. Attention was directed towards the lateral ulnar collateral ligament  repair. A Mitek anchor was placed. The lateral ulnar collateral  ligamentous complex was then reefed with one of the suture arms with the  elbow in a reduced position and supination. Status post implantation of  the radial head arthroplasty, which was then confirmed under fluoroscopy  to have good capture and alignment. With the lateral ligamentous  complex repaired, the remaining soft tissues were repaired with the  other suture arm of lateral complex _____ elbow. This was followed by  copious irrigation and skin closure in a layered fashion. Sterile  dressing and splint was applied.         Kristeen Aase, MD    D: 08/03/2022 15:24:44       T: 08/03/2022 15:29:52     [email or web address]  Job#: [id number]     Doc#: [id number]    CC:

## 2022-08-10 ENCOUNTER — TELEPHONE (OUTPATIENT)
Dept: ORTHOPEDIC SURGERY | Age: 77
End: 2022-08-10

## 2022-08-10 DIAGNOSIS — S52.122A CLOSED DISPLACED FRACTURE OF HEAD OF LEFT RADIUS, INITIAL ENCOUNTER: Primary | ICD-10-CM

## 2022-08-11 ENCOUNTER — OFFICE VISIT (OUTPATIENT)
Dept: ORTHOPEDIC SURGERY | Age: 77
End: 2022-08-11

## 2022-08-11 VITALS — WEIGHT: 210 LBS | HEIGHT: 69 IN | BODY MASS INDEX: 31.1 KG/M2

## 2022-08-11 DIAGNOSIS — S52.122A CLOSED DISPLACED FRACTURE OF HEAD OF LEFT RADIUS, INITIAL ENCOUNTER: Primary | ICD-10-CM

## 2022-08-11 PROCEDURE — 99024 POSTOP FOLLOW-UP VISIT: CPT | Performed by: ORTHOPAEDIC SURGERY

## 2022-08-11 NOTE — PROGRESS NOTES
Days postop left radial head arthroplasty with lateral ulnar collateral ligament repair. Overall is doing well. No complaints. Physical exam: Splint was taken down. Incisions healing nicely. No signs of infection. Able to flex the elbow up to about 130 degrees. Full pronation. Supination limited about 20 degrees. Elbow extension -30. He is neurovascular intact. X-rays in the office today: AP and lateral views of the left elbow demonstrate radial head arthroplasty with good alignment. No new fractures or dislocations. Suture anchor over the lateral ulnar collateral ligamentous complex in good position. Impression office x-rays: Stable left radial head arthroplasty and ligament repair. Assessment 8 days postop doing well    Plan    Home exercise program including elbow extension and supination exercises were explained and demonstrated. A removable posterior elbow splint was fashioned for him incorporating the wrist.  Patient was referred to therapy to improve elbow range of motion. May wean out of the splint for weeks postop. May incorporate light strengthening at 6 weeks. Modalities as needed. Follow-up in 4 weeks.

## 2022-08-16 ENCOUNTER — EVALUATION (OUTPATIENT)
Dept: OCCUPATIONAL THERAPY | Age: 77
End: 2022-08-16
Payer: OTHER GOVERNMENT

## 2022-08-16 DIAGNOSIS — S52.122A CLOSED DISPLACED FRACTURE OF HEAD OF LEFT RADIUS, INITIAL ENCOUNTER: Primary | ICD-10-CM

## 2022-08-16 PROCEDURE — 97530 THERAPEUTIC ACTIVITIES: CPT | Performed by: OCCUPATIONAL THERAPIST

## 2022-08-16 PROCEDURE — 97165 OT EVAL LOW COMPLEX 30 MIN: CPT | Performed by: OCCUPATIONAL THERAPIST

## 2022-08-16 PROCEDURE — 97140 MANUAL THERAPY 1/> REGIONS: CPT | Performed by: OCCUPATIONAL THERAPIST

## 2022-08-16 NOTE — PROGRESS NOTES
OCCUPATIONAL THERAPY INITIAL EVALUATION    [address]  [facility] OCCUPATIONAL THERAPY  [address] [place] [facility] [address]  Dept: [phone]  Loc: [address] [place], [place] [address] OT Fax: [phone]    Date:  2022  Initial Evaluation Date: 22   Evaluating Therapist: Kristel White OT    Patient Name:  Arturo Fuentes    :  1945    Restrictions/Precautions:  ROM, supination and extension focus, brace until 4 weeks post op, low fall risk  Diagnosis:  S52.122A (ICD-10-CM) - Closed displaced fracture of head of left radius, initial encounter     Date of Surgery: 8/3/22 sx    Insurance/Certification information:  Vaccn [employer]  Plan of care signed (Y/N): N  Visit# / total visits:     Referring Practitioner:  Rita Burns MD  Specific Practitioner Orders: Left elbow rom, focus on supination and extension, wean from brace at 4 weeks post op, modalities prn, Scar desensitization and management.     Assessment of current deficits   [] Functional mobility  [x] ADLs  [x] Strength   [] Cognition   [] Functional transfers   [x] IADLs  [] Safety Awareness  [x] Endurance   [x] Fine Motor Coordination  [] Balance  [] Vision/perception  [] Sensation    [] Gross Motor Coordination [x] ROM  [x] Pain   [x] Edema    [x] Scar Adhesion/Skin Integrity     OT PLAN OF CARE   OT POC based on physician orders, patient diagnosis and results of clinical assessment    Frequency/Duration 2-3x a week for 18 sessions  Specific OT Treatment to include:     [x] Instruction in HEP                   Modalities:  [x] Therapeutic Exercise        [x] Ultrasound               [] Electrical Stimulation/Attended  [x] PROM/Stretching                    [x] Fluidotherapy          [x]  Paraffin                   [x] AAROM  [x] AROM                 [] Iontophoresis:   [x] Tendon Glides                                               [] Neuromuscular Re-Ed            [] ADL/IADL re-training    [x] Therapeutic Activity       [x] Pain Management with/without modalities PRN                 [x] Manual Therapy                      [x] Splinting                      [x] Scar Management                   []Joint Protection/Training  []Ergonomics                             [x] Joint Mobilization        [] Adaptive Equipment Assessment/Training                             [x] Manual Edema Mobilization   [] Myofascial Release                 [] Energy Conservation/Work Simplification  [x] GM/FM Coordination       [] Safety retraining/education per  individual diagnosis/goals  [] Desensitization       Patient Specific Goal: to regain function in L arm to be able to go to [facility] in october                             GOALS (Long term same as Short term):  1) Patient will demonstrate good understanding of home program (exercises/activities/diagnosis/prognosis/goals) with good accuracy. 2) Patient will demonstrate increased active/passive range of motion of their LUE to [facility] for ADL/IADL completion to be able to increase completion in dressing tasks, socks and holding utensils to cut his food. 3) Patient will demonstrate increased /pinch strength of at least 10 / 5 pinch pounds of their L hand to be able to cut his lawn with the . 4) Patient to report decreased pain in their affected L upper extremity from 8/10 to 3/10 or less with resistive functional use. 5) Increase in fine motor function as evidenced by decreased time to complete 9-hole peg test and/or MRMT test by at least 5-10 seconds. 6) Patient to report 100% compliance with their splint wear, care, and precautions if needed. 7) Patient will be knowledgeable of edema control techniques as evident with decreases from moderate to mild/none. 8) Patient will demonstrate a non-tender/non-adherent scar.    9) Patient will decrease QuickDASH score to 20% or less for increased participation in daily functional activities. Past Medical History:   Past Medical History:   Diagnosis Date    Arthritis     CAD (coronary artery disease) 2017    Cardiogist Dr Jerrel Epley in [facility] [employer] / last checkup 6/2022 - pt states no change in care at this visit    Fall 06/28/2022    fell down steps - pt states he was in the dark in an unfamiliar house - no complaints of dizziness prior to fall    Hyperlipidemia     Hypertension     Left radial head fracture 06/28/2022    fell down steps    GAGE on CPAP     pt does not know the setting     Past Surgical History:   Past Surgical History:   Procedure Laterality Date    COLONOSCOPY      CORONARY ARTERY BYPASS GRAFT  2017    At [address] [employer] Left 8/3/2022    LEFT RADIAL HEAD ARTHROPLASTY WITH LIGAMENT REPAIR RADIAL ULNAR JOINT performed by Christy Catalan MD at [address]       Reason for Referral: Pt is a 68year old male who presents this date for initial occupational therapy evaluation. Pt states that he fell down a flight of stairs at his sons house where he suffered a fx of the elbow as well as several other injuries. Pt was referred to the referring physician where he was evaluated and surgical intervention was determined to be the best course for recovery. PT underwent surgery (detailed below) on 8/3/22. Since patient has been placed in a splint that he states that he has been wearing most of the day and takes breaks to do exercises but he wears it to bed. PROCEDURES PERFORMED:  1. Left radial head arthroplasty using the [facility] size +2,  8.5-mm stem with a size +4, 24-mm radial head. 2.  Nonoperative management of type 2 coronoid fracture. 3.  Left elbow lateral ulnar collateral ligament repair. 4.  Use of intraoperative fluoroscopy with a radial pull test negative  for forearm axis instability. 5.  Lateral ulnar collateral ligament repair using the Mitek anchor.     Home Living: home with fiance  Prior Level of Function: independent Cognition:   Alert/Oriented x3     IADL STATUS:   Ind Mod I Min A Mod A Max A Dep Other   Homemaking Responsibility    x      Shopping Responsibility:   x       Mode of Transportation:  x        Leisure & Hobbies:  x        Work:       x     Comments: Pt is retired, states that his fiance is helping him with IADLs such as cutting the grass, driving, cleaning and cooking. Pt states he is able to drive but if possible his fiance is driving him. ADL STATUS:   Ind Mod I Min A Mod A Max A Dep Other   Feeding:   x       Grooming:  x        Bathing:  x        UE Dressing:   x       LE Dressing:   x       Toileting:  x        Transfers:  x          Comments: assistance required for fine motor tasks (buttons and zippers) as well as donning socks    Pain Level: best pain at rest is no pain, worst pain 8/10 with supination or sudden motions    UE Assessment:  Left Upper Extremity ROM  ELBOW Flexion 0-150* 110       Extension 0* -25        FOREARM Pronation 80-90* NT       Supination 80-90* neutral        WRIST Flexion 0-80* 50       Extension 0-70* 30       Radial Deviation 0-20* 20       Ulnar Deviation 0-30* 15        Pt unable to make a full composite fist, 2.5 cm away from [facility]  Pt does demonstrate full opposition from thumb to SF. Comment: Hand Dominance is right    Sensation: slight tingling noted in the dorsal aspect of the forearm, pt states it is intermittent     Edema Description/Circumferential Measurements:   Moderate edema from hand to forearm, 24 cm circumferential around MCPs of the L hand, 19.5 cm circumferential around the L wrist  Dynamometer (setting 2): not tested, not appropriate         Pinch Meter: not tested, not appropriate     9 Hole Peg Test: not tested, not appropriate     QuickDASH Score: 82% disability    Intervention: retrograde massage performed to the wrist and hand with good tolerance and results with decreased edema in the hand.  Pt issued a tubigrip compression sleeve to be worn to decrease his swelling-will transition to edema glove within the next few sessions. Pt issued and instructed in HEP-tendon gliding, wrist ROM and elbow flexion/extension and supination-to be performed 3-5x a day for 10 reps. Pt demonstrated well with no questions or concerns. Pt instructed to continue to wear his splint as well. Will increase as tolerated        Eval Complexity: low  Profile and History- Chart reviewed  Assessment of Occupational Performance and Identification of Deficits- 9   Clinical Decision Making- no additional modifications required    Rehab Potential:                                 [x] Good  [] Fair  [] Poor        Suggested Professional Referral:       [x] No  [] Yes:  Barriers to Goal Achievement[de-identified]          [x] No  [] Yes:  Domestic Concerns:                           [x] No  [] Yes:       Patient. Education:  [x] Plans/Goals, Risks/Benefits discussed  [x] Home exercise program  Method of Education: [x] Verbal  [x] Demo  [x] Written  Comprehension of Education:  [x] Verbalizes understanding. [x] Demonstrates understanding. [] Needs Review. [] Demonstrates/verbalizes understanding of HEP/Ed previously given. Patient understands diagnosis/prognosis and consents to treatment, plan and goals: [x] Yes    [] No     Goal Formulation: Patient  Time In: 3:00            Time Out: 3:45                      Timed Code Treatment Minutes: 25 minutes      CODE  Minutes  Units   08992 OT Eval Low 20 1   16613 OT Eval Medium     03419 OT Eval High     85556 Fluidotherapy     29145 Manual 15 1   80084 Therapeutic Ex     06972 Therapeutic Activity 10 1   69294 ADL/COMP Tech Train     [id number] Neuromuscular Re-Ed     10737 OrthoManagementTraining     58983 Paraffin     64522 Electrical Stim - Attended     [id number] Iontophoresis     22124 Ultrasound      Other                Electronically signed by:  [address] [place] [address] #[id number]     [id number] Certification / Comments Frequency/Duration 2-3x / week for 18 visits. Certification period From: 8/16/22  To: 11//16/22     I have reviewed the Plan of Care established for skilled therapy services and certify that the services are required and that they will be provided while the patient is under my care. Physician's Comments/Revisions:                   Physicians's Printed Name:  Talia Gray MD                                   [de-identified] Signature:                                                               Date:      Please review Patient's OT evaluation and if you agree sign/date and fax back to us at our [address] [place] [address] OT Fax: [phone].  Thank you for your referral!

## 2022-08-22 ENCOUNTER — TREATMENT (OUTPATIENT)
Dept: OCCUPATIONAL THERAPY | Age: 77
End: 2022-08-22
Payer: OTHER GOVERNMENT

## 2022-08-22 DIAGNOSIS — S52.122A CLOSED DISPLACED FRACTURE OF HEAD OF LEFT RADIUS, INITIAL ENCOUNTER: Primary | ICD-10-CM

## 2022-08-22 PROCEDURE — 97140 MANUAL THERAPY 1/> REGIONS: CPT | Performed by: OCCUPATIONAL THERAPIST

## 2022-08-22 PROCEDURE — 97110 THERAPEUTIC EXERCISES: CPT | Performed by: OCCUPATIONAL THERAPIST

## 2022-08-22 NOTE — PROGRESS NOTES
[address] [facility] [address]  [phone]    Date:  2022    Initial Evaluation Date: 22                                Evaluating Therapist: [address], [place]     Patient Name:  Deniz Stapleton                       :  1945     Restrictions/Precautions:  ROM, supination and extension focus, brace until 4 weeks post op, low fall risk  Diagnosis:  S52.122A (ICD-10-CM) - Closed displaced fracture of head of left radius, initial encounter                                    Date of Surgery: 8/3/22 sx     Insurance/Certification information:  Vaccn Optum  Plan of care signed (Y/N): N  Visit# / total visits:      Referring Practitioner:  Ruddy Cuellar MD  Specific Practitioner Orders: Left elbow rom, focus on supination and extension, wean from brace at 4 weeks post op, modalities prn, Scar desensitization and management. OT PLAN OF CARE   OT POC based on physician orders, patient diagnosis and results of clinical assessment     Frequency/Duration 2-3x a week for 18 sessions     Patient Specific Goal: to regain function in L arm to be able to go to [employer] in october                             GOALS (Long term same as Short term):  1) Patient will demonstrate good understanding of home program (exercises/activities/diagnosis/prognosis/goals) with good accuracy. 2) Patient will demonstrate increased active/passive range of motion of their LUE to [facility] for ADL/IADL completion to be able to increase completion in dressing tasks, socks and holding utensils to cut his food. 3) Patient will demonstrate increased /pinch strength of at least 10 / 5 pinch pounds of their L hand to be able to cut his lawn with the . 4) Patient to report decreased pain in their affected L upper extremity from 8/10 to 3/10 or less with resistive functional use.    5) Increase in fine motor function as evidenced by decreased time to complete 9-hole peg test and/or MRMT test by at least 5-10 seconds. 6) Patient to report 100% compliance with their splint wear, care, and precautions if needed. 7) Patient will be knowledgeable of edema control techniques as evident with decreases from moderate to mild/none. 8) Patient will demonstrate a non-tender/non-adherent scar. 9) Patient will decrease QuickDASH score to 20% or less for increased participation in daily functional activities. TODAY'S PROGRESS NOTE:    Pain Level: 2 on scale of 1-10, aching    Subjective: \"elbow is feeling good, light pain. I have not been wearing my brace because it feels better when I dont wear it. .\"     Objective:  Updated POC to be completed by 10th visit. INTERVENTION: COMPLETED: SPECIFICS/COMMENTS:   Modality:     Mhp elbow x 10 min to increase tissue mobility        AROM:     hand x Small peg activity 10 min   wrist x Ext/flex/rad/ulnar dev  Wrist maze 10x   elbow x Ext/flex/sup/pron  Arc with elbow ext, sup/pron incorp.      shoulder x Flex/ext, abd  15x2   AAROM:               PROM/Stretching:               Scar Mass/Edema Control:     Soft tissue mobilization x 10 min to increase tissue mobility        Strengthening:               Other:                 Assessment/Comments: Pt is making Good progress toward stated plan of care. Pt. Walked into therapy without splint. Therapist encouraged pt. To cont splint wear when not exercising. Pt. At three weeks this wed and is to cont 24/7 splint wear except with exercises and hygiene for one more week before weaning out.  Good steven with exercises noted    -Rehab Potential: Good    Billing:    TIME IN:          TIME OUT:           TOTAL TREATMENT TIME:          CODE   TODAY MINUTES TODAY UNITS     32919 Fluidotherapy         54165 Manual 15  1      44667 Therapeutic Ex 30 2     13728 Therapeutic Activity       37316 ADL/COMP Tech Train         43321 Neuromuscular Re-Ed       82714 OrthoManagementTraining           Modality:mhp 10          Other TOTAL   55 3                    Goals: Goals for pt can be seen on initial evaluation. Plan:   [x]  Continues Plan of care: Treatment covered based on POC and graduated to patient's progress. Pt education continues at each visit to obtain maximum benefits from skilled OT intervention.   []  Alter Plan of care:   []  Discharge:      Talat Morataya OT/L, [address]

## 2022-08-25 ENCOUNTER — TREATMENT (OUTPATIENT)
Dept: OCCUPATIONAL THERAPY | Age: 77
End: 2022-08-25
Payer: OTHER GOVERNMENT

## 2022-08-25 DIAGNOSIS — S52.122A CLOSED DISPLACED FRACTURE OF HEAD OF LEFT RADIUS, INITIAL ENCOUNTER: Primary | ICD-10-CM

## 2022-08-25 PROCEDURE — 97140 MANUAL THERAPY 1/> REGIONS: CPT | Performed by: OCCUPATIONAL THERAPIST

## 2022-08-25 PROCEDURE — 97110 THERAPEUTIC EXERCISES: CPT | Performed by: OCCUPATIONAL THERAPIST

## 2022-08-25 NOTE — PROGRESS NOTES
[address]  [phone]    Date:  2022    Initial Evaluation Date: 22                                Evaluating Therapist: [address], OT     Patient Name:  Beulah Rajan                       :  1945     Restrictions/Precautions:  ROM, supination and extension focus, brace until 4 weeks post op, low fall risk  Diagnosis:  S52.122A (ICD-10-CM) - Closed displaced fracture of head of left radius, initial encounter                                    Date of Surgery: 8/3/22 sx     Insurance/Certification information:  V[employer]  Plan of care signed (Y/N): N  Visit# / total visits: 3 / 18     Referring Practitioner:  Steva Burkitt MD  Specific Practitioner Orders: Left elbow rom, focus on supination and extension, wean from brace at 4 weeks post op, modalities prn, Scar desensitization and management. OT PLAN OF CARE   OT POC based on physician orders, patient diagnosis and results of clinical assessment     Frequency/Duration 2-3x a week for 18 sessions     Patient Specific Goal: to regain function in L arm to be able to go to [employer] in october                             GOALS (Long term same as Short term):  1) Patient will demonstrate good understanding of home program (exercises/activities/diagnosis/prognosis/goals) with good accuracy. 2) Patient will demonstrate increased active/passive range of motion of their LUE to [facility] for ADL/IADL completion to be able to increase completion in dressing tasks, socks and holding utensils to cut his food. 3) Patient will demonstrate increased /pinch strength of at least 10 / 5 pinch pounds of their L hand to be able to cut his lawn with the . 4) Patient to report decreased pain in their affected L upper extremity from 8/10 to 3/10 or less with resistive functional use.    5) Increase in fine motor function as evidenced by decreased time to complete 9-hole peg test and/or MRMT test by at least 5-10 seconds. 6) Patient to report 100% compliance with their splint wear, care, and precautions if needed. 7) Patient will be knowledgeable of edema control techniques as evident with decreases from moderate to mild/none. 8) Patient will demonstrate a non-tender/non-adherent scar. 9) Patient will decrease QuickDASH score to 20% or less for increased participation in daily functional activities. TODAY'S PROGRESS NOTE:    Pain Level: 2 on scale of 1-10, aching    Subjective: \"elbow is doing good. \"     Objective:  Updated POC to be completed by 10th visit. INTERVENTION: COMPLETED: SPECIFICS/COMMENTS:   Modality:     Mhp elbow x 10 min to increase tissue mobility        AROM:     hand x Small peg activity 10 min   wrist X  x Ext/flex/rad/ulnar dev  Wrist maze 10x   elbow X  x Ext/flex/sup/pron  Arc with elbow ext, sup/pron incorp.      shoulder x Flex/ext, abd  15x2   AAROM:               PROM/Stretching:     Wrist/ x All planes light passive   elbow x All planes  light passive , within tolerance   Scar Mass/Edema Control:     Soft tissue mobilization x 10 min to increase tissue mobility        Strengthening:               Other:                 Assessment/Comments: Pt is making Good progress toward stated plan of care. Wearing splint today to appointment. Good steven with exercises. Some weakness noted and shoulder tightness and weakness.     -Rehab Potential: Good    Billing:    TIME IN:          TIME OUT:           TOTAL TREATMENT TIME:          CODE   TODAY MINUTES TODAY UNITS     81157 Fluidotherapy         58024 Manual 15  1      26667 Therapeutic Ex 30 2     95221 Therapeutic Activity       40449 ADL/COMP Tech Train         33258 Neuromuscular Re-Ed       90827 OrthoManagementTraining           Modality:mhp 10          Other                                             TOTAL   55 3                    Goals: Goals for pt can be seen on initial evaluation.     Plan:   [x]  Continues Plan of care: Treatment covered based on POC and graduated to patient's progress. Pt education continues at each visit to obtain maximum benefits from skilled OT intervention.   []  Alter Plan of care:   []  Discharge:      Abdoul Michaud OT/ARIANA, [address]

## 2022-09-01 ENCOUNTER — TREATMENT (OUTPATIENT)
Dept: OCCUPATIONAL THERAPY | Age: 77
End: 2022-09-01
Payer: OTHER GOVERNMENT

## 2022-09-01 DIAGNOSIS — S52.122A CLOSED DISPLACED FRACTURE OF HEAD OF LEFT RADIUS, INITIAL ENCOUNTER: Primary | ICD-10-CM

## 2022-09-01 PROCEDURE — 97140 MANUAL THERAPY 1/> REGIONS: CPT | Performed by: OCCUPATIONAL THERAPIST

## 2022-09-01 PROCEDURE — 97110 THERAPEUTIC EXERCISES: CPT | Performed by: OCCUPATIONAL THERAPIST

## 2022-09-01 NOTE — PROGRESS NOTES
[address]  [phone]    Date:  2022    Initial Evaluation Date: 22                                Evaluating Therapist: Carlos Valentin OT     Patient Name:  Eliu Noyola                       :  1945     Restrictions/Precautions:  ROM, supination and extension focus, brace until 4 weeks post op, low fall risk  Diagnosis:  S52.122A (ICD-10-CM) - Closed displaced fracture of head of left radius, initial encounter                                    Date of Surgery: 8/3/22 sx ( 4 weeks post)     Insurance/Certification information:  Vacc[employer]  Plan of care signed (Y/N): N  Visit# / total visits: 3 / 18     Referring Practitioner:  Lanny Wagner MD  Specific Practitioner Orders: Left elbow rom, focus on supination and extension, wean from brace at 4 weeks post op, modalities prn, Scar desensitization and management. OT PLAN OF CARE   OT POC based on physician orders, patient diagnosis and results of clinical assessment     Frequency/Duration 2-3x a week for 18 sessions     Patient Specific Goal: to regain function in L arm to be able to go to [employer] in october                             GOALS (Long term same as Short term):  1) Patient will demonstrate good understanding of home program (exercises/activities/diagnosis/prognosis/goals) with good accuracy. 2) Patient will demonstrate increased active/passive range of motion of their LUE to [facility] for ADL/IADL completion to be able to increase completion in dressing tasks, socks and holding utensils to cut his food. 3) Patient will demonstrate increased /pinch strength of at least 10 / 5 pinch pounds of their L hand to be able to cut his lawn with the . 4) Patient to report decreased pain in their affected L upper extremity from 8/10 to 3/10 or less with resistive functional use.    5) Increase in fine motor function as evidenced by decreased time to complete 9-hole peg test and/or MRMT test by at least 5-10 seconds. 6) Patient to report 100% compliance with their splint wear, care, and precautions if needed. 7) Patient will be knowledgeable of edema control techniques as evident with decreases from moderate to mild/none. 8) Patient will demonstrate a non-tender/non-adherent scar. 9) Patient will decrease QuickDASH score to 20% or less for increased participation in daily functional activities. TODAY'S PROGRESS NOTE:    Pain Level: 5/10 on the dorsal forearm, aching that goes up shoulder    Subjective: Pt reports more motion. Objective:  Updated POC to be completed by 10th visit. INTERVENTION: COMPLETED: SPECIFICS/COMMENTS:   Modality:     Mhp elbow x 10 min to increase tissue mobility prior to motion. AROM:     hand x Small peg activity 10 min  -Towel scrunches x10 + HEP   wrist X   Ext/flex/rad/ulnar dev x10  Wrist maze 10x   elbow X   Ext/flex/sup/pron x10  - supination towel stretch x5 + HEP  Arc with elbow ext, sup/pron incorp.      shoulder  Flex/ext, abd  15x2   AAROM:               PROM/Stretching:     Wrist/ x All planes light passive   elbow x All planes  light passive  within tolerance   Scar Mass/Edema Control:     Soft tissue mobilization  10 min to increase tissue mobility        Strengthening:               Other:     Oval 8 splint x Pt noted to have small deformity similar to a swan neck deformity starting on the MF. Splint issued to wear throughout the day on/off and at night. Assessment/Comments: Pt is making Good progress toward stated plan of care. Pt tolerated session well with added exercises for supination and digit motion. Pt demonstrates excellent AROM of the elbow however was educated to push himself slightly at end ranges due to tightness upon arrival to therapy. Pt is to wean out of splint at this time ( 1 hour a day until the splint is completely off during the day). Continue to progress as tolerated.      -Rehab

## 2022-09-09 ENCOUNTER — TREATMENT (OUTPATIENT)
Dept: OCCUPATIONAL THERAPY | Age: 77
End: 2022-09-09
Payer: OTHER GOVERNMENT

## 2022-09-09 DIAGNOSIS — S52.122A CLOSED DISPLACED FRACTURE OF HEAD OF LEFT RADIUS, INITIAL ENCOUNTER: Primary | ICD-10-CM

## 2022-09-09 PROCEDURE — 97530 THERAPEUTIC ACTIVITIES: CPT | Performed by: OCCUPATIONAL THERAPIST

## 2022-09-09 PROCEDURE — 97110 THERAPEUTIC EXERCISES: CPT | Performed by: OCCUPATIONAL THERAPIST

## 2022-09-09 PROCEDURE — 97140 MANUAL THERAPY 1/> REGIONS: CPT | Performed by: OCCUPATIONAL THERAPIST

## 2022-09-09 NOTE — PROGRESS NOTES
[address]  [phone]    Date:  2022    Initial Evaluation Date: 22                                Evaluating Therapist: Gabriela Hadley OT     Patient Name:  Salud Tripathi                      :  1945     Restrictions/Precautions:  ROM, supination and extension focus, brace until 4 weeks post op, low fall risk  Diagnosis:  S52.122A (ICD-10-CM) - Closed displaced fracture of head of left radius, initial encounter                                    Date of Surgery: 8/3/22 sx ( 5 weeks post)     Insurance/Certification information:  Vaccn [employer]  Plan of care signed (Y/N): N  Visit# / total visits:      Referring Practitioner:  Larry Teran MD  Specific Practitioner Orders: Left elbow rom, focus on supination and extension, wean from brace at 4 weeks post op, modalities prn, Scar desensitization and management. OT PLAN OF CARE   OT POC based on physician orders, patient diagnosis and results of clinical assessment     Frequency/Duration 2-3x a week for 18 sessions     Patient Specific Goal: to regain function in L arm to be able to go to [employer] in october                             GOALS (Long term same as Short term):  1) Patient will demonstrate good understanding of home program (exercises/activities/diagnosis/prognosis/goals) with good accuracy. 2) Patient will demonstrate increased active/passive range of motion of their LUE to [facility] for ADL/IADL completion to be able to increase completion in dressing tasks, socks and holding utensils to cut his food. 3) Patient will demonstrate increased /pinch strength of at least 10 / 5 pinch pounds of their L hand to be able to cut his lawn with the . 4) Patient to report decreased pain in their affected L upper extremity from 8/10 to 3/10 or less with resistive functional use.    5) Increase in fine motor function as evidenced by decreased time to complete 9-hole peg test and/or MRMT test by at least 5-10 seconds. 6) Patient to report 100% compliance with their splint wear, care, and precautions if needed. 7) Patient will be knowledgeable of edema control techniques as evident with decreases from moderate to mild/none. 8) Patient will demonstrate a non-tender/non-adherent scar. 9) Patient will decrease QuickDASH score to 20% or less for increased participation in daily functional activities. Pain Level: 4/10 in the bicep, aching    Subjective: Pt reports using his arm more and completed yard work yesterday. Objective:  Updated POC to be completed by 10th visit. INTERVENTION: COMPLETED: SPECIFICS/COMMENTS:   Modality:     MHP elbow x 10 min to increase tissue mobility prior to motion. AROM:     hand x Small peg activity 10 min  -Towel scrunches x10 + HEP   wrist X   Ext/flex/rad/ulnar dev x10  Wrist maze 10x   elbow X   Ext/flex/sup/pron x10  - supination towel stretch x5 + HEP  Arc with elbow ext, sup/pron incorp.      shoulder  Flex/ext, abd  15x2   AAROM:               PROM/Stretching:     Wrist/ x All planes light passive   elbow x All planes  light passive  within tolerance  - Wall extension/flexion stretch 2 x5 with therapist assist to avoid compensation at the shoulder and maintaining supination. Scar Mass/Edema Control:     Soft tissue mobilization x 10 min to increase tissue mobility and decrease muscle tension. Strengthening:               Other:     Oval 8 splint  Pt noted to have small deformity similar to a swan neck deformity starting on the MF. Splint issued to wear throughout the day on/off and at night. Compression sleeve x Size E issued for elbow and hand to decrease edema. Assessment/Comments: Pt is making Good progress toward stated plan of care. Pt tolerated session well with added wall flexion/extension elbow exercises as well as supination to assist with increasing stretch.  Pt demonstrates short rest breaks throughout session due to slight increased pain with stretching. Pt did demonstrate increase AROM however increased edema at the hand. Compression sleeves issued to assist with this and education for massage.     -Rehab Potential: Good    Billing:    TIME IN: 8:00 am         TIME OUT: 9:00 am                 CODE   TODAY MINUTES TODAY UNITS     26645 Fluidotherapy       30709 Manual 20 1     14975 Therapeutic Ex 25 2     40502 Therapeutic Activity 15 1     48894 ADL/COMP Tech Train       69756 Neuromuscular Re-Ed       24272 OrthoManagementTraining         Modality:mhp         Other                                     TOTAL   60 4                    Goals: Goals for pt can be seen on initial evaluation. Plan:   [x]  Continues Plan of care: Treatment covered based on POC and graduated to patient's progress. Pt education continues at each visit to obtain maximum benefits from skilled OT intervention.   []  Alter Plan of care:   []  Discharge:    Paola Mendosa [address], OTR/L #[id number]

## 2022-09-13 ENCOUNTER — OFFICE VISIT (OUTPATIENT)
Dept: ORTHOPEDIC SURGERY | Age: 77
End: 2022-09-13

## 2022-09-13 ENCOUNTER — TREATMENT (OUTPATIENT)
Dept: OCCUPATIONAL THERAPY | Age: 77
End: 2022-09-13
Payer: OTHER GOVERNMENT

## 2022-09-13 VITALS — HEIGHT: 70 IN | WEIGHT: 214 LBS | BODY MASS INDEX: 30.64 KG/M2

## 2022-09-13 DIAGNOSIS — S52.122A CLOSED DISPLACED FRACTURE OF HEAD OF LEFT RADIUS, INITIAL ENCOUNTER: Primary | ICD-10-CM

## 2022-09-13 PROCEDURE — 97110 THERAPEUTIC EXERCISES: CPT | Performed by: OCCUPATIONAL THERAPIST

## 2022-09-13 PROCEDURE — 97022 WHIRLPOOL THERAPY: CPT | Performed by: OCCUPATIONAL THERAPIST

## 2022-09-13 PROCEDURE — 97530 THERAPEUTIC ACTIVITIES: CPT | Performed by: OCCUPATIONAL THERAPIST

## 2022-09-13 PROCEDURE — 99024 POSTOP FOLLOW-UP VISIT: CPT | Performed by: ORTHOPAEDIC SURGERY

## 2022-09-13 NOTE — PROGRESS NOTES
[address]  [phone]    Date:  2022    Initial Evaluation Date: 22                                Evaluating Therapist: Prabhjot El OT     Patient Name:  Jose Velazquez                      :  1945     Restrictions/Precautions:  ROM, supination and extension focus, brace until 4 weeks post op, low fall risk  Diagnosis:  S52.122A (ICD-10-CM) - Closed displaced fracture of head of left radius, initial encounter                                    Date of Surgery: 8/3/22 sx ( 6 weeks post)     Insurance/Certification information:  Vaccn [employer]  Plan of care signed (Y/N): N  Visit# / total visits:      Referring Practitioner:  Salinas Beltran MD  Specific Practitioner Orders: Left elbow rom, focus on supination and extension, wean from brace at 4 weeks post op, modalities prn, Scar desensitization and management. OT PLAN OF CARE   OT POC based on physician orders, patient diagnosis and results of clinical assessment     Frequency/Duration 2-3x a week for 18 sessions     Patient Specific Goal: to regain function in L arm to be able to go to [employer] in october                             GOALS (Long term same as Short term):  1) Patient will demonstrate good understanding of home program (exercises/activities/diagnosis/prognosis/goals) with good accuracy. 2) Patient will demonstrate increased active/passive range of motion of their LUE to [facility] for ADL/IADL completion to be able to increase completion in dressing tasks, socks and holding utensils to cut his food. 3) Patient will demonstrate increased /pinch strength of at least 10 / 5 pinch pounds of their L hand to be able to cut his lawn with the . 4) Patient to report decreased pain in their affected L upper extremity from 8/10 to 3/10 or less with resistive functional use.    5) Increase in fine motor function as evidenced by decreased time to complete 9-hole peg test and/or MRMT test by at least 5-10 seconds. 6) Patient to report 100% compliance with their splint wear, care, and precautions if needed. 7) Patient will be knowledgeable of edema control techniques as evident with decreases from moderate to mild/none. 8) Patient will demonstrate a non-tender/non-adherent scar. 9) Patient will decrease QuickDASH score to 20% or less for increased participation in daily functional activities. Pain Level: occasional pain in the bicep, 4/10 aching    Subjective: Pt reports he has more range of the elbow since last session. Objective:  Updated POC to be completed by 10th visit. INTERVENTION: COMPLETED: SPECIFICS/COMMENTS:   Modality:     MHP elbow  10 min to increase tissue mobility prior to motion. Fluidotherapy x 15 mins with AROM exercises completed for digits and wrist to decrease tightness in the forearm and elbow while monitored. AROM:     hand x Small peg activity 10 min  -Towel scrunches x10 + HEP   wrist X   Ext/flex/rad/ulnar dev x10  Jux-A-cizer with elbow at side x3 mins   elbow X   Ext/flex/sup/pron x10  - supination towel stretch x5 + HEP  - Reaching shoulder height to place pegs in peg board for increased elbow extension/reaching.   - supination activities to  objects and remove objects from in front of him and at hip height  Arc with elbow ext, sup/pron incorp.      shoulder  Flex/ext, abd  15x2   AAROM:               PROM/Stretching:     Wrist/ x All planes light passive   elbow x All planes  light passive  within tolerance  - Wall extension/flexion stretch 2 x5 with therapist assist to avoid compensation at the shoulder and maintaining supination.   - Elbow extension stretch with red theraband x4 rounds with 15 sec holds. Scar Mass/Edema Control:     Soft tissue mobilization  10 min to increase tissue mobility and decrease muscle tension.          Strengthening:               Other:     Oval 8 splint  Pt noted to have small deformity similar to a swan neck deformity starting on the MF. Splint issued to wear throughout the day on/off and at night. Compression sleeve x Size E issued for elbow and hand to decrease edema. Assessment/Comments: Pt is making Good progress toward stated plan of care. Pt tolerated session well with increased extension and supination noted by end of session. Added extension stretch to HEP and continued supination activities encouraged. Extension was measured at -15* mid way through session. Flexion of elbow noted to be [facility] [place] after stretching and functional use. Continue to progress as tolerated. -Rehab Potential: Good    Billing:    TIME IN: 10:00 am         TIME OUT: 11:00 am              CODE   TODAY MINUTES TODAY UNITS     24379 Fluidotherapy  15 1      63892 Manual       99313 Therapeutic Ex 25 2     12292 Therapeutic Activity 20 1     09111 ADL/COMP Tech Train       84233 Neuromuscular Re-Ed       85874 OrthoManagementTraining         Modality:mhp         Other                                     TOTAL   60 4                    Goals: Goals for pt can be seen on initial evaluation. Plan:   [x]  Continues Plan of care: Treatment covered based on POC and graduated to patient's progress. Pt education continues at each visit to obtain maximum benefits from skilled OT intervention.   []  Alter Plan of care:   []  Discharge:    Paola Gimenez [address], OTR/L #[id number]

## 2022-09-13 NOTE — PROGRESS NOTES
Weeks out left radial head arthroplasty with lateral ulnar collateral ligament repair doing well. He is pleased with his progress. Minimal discomfort. Physical exam: Scar mature. Elbow range of motion is from 30 degrees of flexion to about 130 degrees of flexion. Full pronation. Supination limited to 25 degrees. Distally he is able to nearly make a full composite fist.  However he does have a approximately 1.5 cm tip to distal palmar crease lag. Full digital extension. Otherwise neurovascular intact. X-rays in the office today of left elbow AP and lateral views demonstrate radial head arthroplasty and lateral anchor in good position. Radiocapitellar joint is well aligned. 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 He has minimal discomfort. He reports he is almost back to his baseline. He like to continue with strengthening. He is going to [place] for the winter. I did advise him that once he starts a strengthening program he can continue with therapy in [place].   He may follow-up after completing therapy as needed

## 2022-09-15 ENCOUNTER — TREATMENT (OUTPATIENT)
Dept: OCCUPATIONAL THERAPY | Age: 77
End: 2022-09-15
Payer: OTHER GOVERNMENT

## 2022-09-15 DIAGNOSIS — S52.122A CLOSED DISPLACED FRACTURE OF HEAD OF LEFT RADIUS, INITIAL ENCOUNTER: Primary | ICD-10-CM

## 2022-09-15 PROCEDURE — 97022 WHIRLPOOL THERAPY: CPT | Performed by: OCCUPATIONAL THERAPIST

## 2022-09-15 PROCEDURE — 97530 THERAPEUTIC ACTIVITIES: CPT | Performed by: OCCUPATIONAL THERAPIST

## 2022-09-15 PROCEDURE — 97110 THERAPEUTIC EXERCISES: CPT | Performed by: OCCUPATIONAL THERAPIST

## 2022-09-15 NOTE — PROGRESS NOTES
[address]  [phone]    Date:  9/15/2022    Initial Evaluation Date: 22                                Evaluating Therapist: Destinee Galarza OT     Patient Name:  Yessy Linda                      :  1945     Restrictions/Precautions:  ROM, supination and extension focus, brace until 4 weeks post op, low fall risk  Diagnosis:  S52.122A (ICD-10-CM) - Closed displaced fracture of head of left radius, initial encounter                                    Date of Surgery: 8/3/22 sx (  7 weeks post)     Insurance/Certification information:  Vaccn [employer]  Plan of care signed (Y/N): N  Visit# / total visits:      Referring Practitioner:  Hakeem Tirado MD  Specific Practitioner Orders: Left elbow rom, focus on supination and extension, wean from brace at 4 weeks post op, modalities prn, Scar desensitization and management. OT PLAN OF CARE   OT POC based on physician orders, patient diagnosis and results of clinical assessment     Frequency/Duration 2-3x a week for 18 sessions     Patient Specific Goal: to regain function in L arm to be able to go to [employer] in october                             GOALS (Long term same as Short term):  1) Patient will demonstrate good understanding of home program (exercises/activities/diagnosis/prognosis/goals) with good accuracy. 2) Patient will demonstrate increased active/passive range of motion of their LUE to [facility] for ADL/IADL completion to be able to increase completion in dressing tasks, socks and holding utensils to cut his food. 3) Patient will demonstrate increased /pinch strength of at least 10 / 5 pinch pounds of their L hand to be able to cut his lawn with the . 4) Patient to report decreased pain in their affected L upper extremity from 8/10 to 3/10 or less with resistive functional use.    5) Increase in fine motor function as evidenced by decreased time to complete 9-hole peg test and/or MRMT test by at least 5-10 seconds. 6) Patient to report 100% compliance with their splint wear, care, and precautions if needed. 7) Patient will be knowledgeable of edema control techniques as evident with decreases from moderate to mild/none. 8) Patient will demonstrate a non-tender/non-adherent scar. 9) Patient will decrease QuickDASH score to 20% or less for increased participation in daily functional activities. Pain Level: 3/10 in the forearm, aching    Subjective: Pt reports he was able to following the steering wheel with his LUE. Objective:  Updated POC to be completed by 10th visit. INTERVENTION: COMPLETED: SPECIFICS/COMMENTS:   Modality:     MHP elbow  10 min to increase tissue mobility prior to motion. Fluidotherapy x 15 mins with AROM exercises completed for digits and wrist to decrease tightness in the forearm and elbow while monitored. AROM:     hand x Small peg activity 10 min  -Towel scrunches x10 + HEP   wrist X   Ext/flex/rad/ulnar dev x10  Jux-A-cizer with elbow at side x3   - pron/sup activity with light resistance clothes pin to  pom pom and place into container. elbow X   Ext/flex/sup/pron x10  - supination towel stretch x5 + HEP  - Reaching shoulder height to place pegs in peg board for increased elbow extension/reaching.   - supination activities to  objects and remove objects from in front of him and at hip height  Arc with elbow ext, sup/pron incorp.      shoulder  Flex/ext, abd  15x2   AAROM:     LUE x Ball AAROM in all planes of the wrist: flexion,extension, sup/pron, clock-wise x15        PROM/Stretching:     Wrist/ x All planes light passive   elbow x All planes  light passive  within tolerance  - Wall extension/flexion stretch 2 x5 ^ x10 with therapist assist to avoid compensation at the shoulder and maintaining supination.   - Elbow extension stretch with red theraband x4 rounds with 15 sec holds.     Scar Mass/Edema Control:

## 2022-09-20 ENCOUNTER — TREATMENT (OUTPATIENT)
Dept: OCCUPATIONAL THERAPY | Age: 77
End: 2022-09-20
Payer: OTHER GOVERNMENT

## 2022-09-20 DIAGNOSIS — S52.122A CLOSED DISPLACED FRACTURE OF HEAD OF LEFT RADIUS, INITIAL ENCOUNTER: Primary | ICD-10-CM

## 2022-09-20 PROCEDURE — 97022 WHIRLPOOL THERAPY: CPT | Performed by: OCCUPATIONAL THERAPIST

## 2022-09-20 PROCEDURE — 97530 THERAPEUTIC ACTIVITIES: CPT | Performed by: OCCUPATIONAL THERAPIST

## 2022-09-20 PROCEDURE — 97110 THERAPEUTIC EXERCISES: CPT | Performed by: OCCUPATIONAL THERAPIST

## 2022-09-20 NOTE — PROGRESS NOTES
[address]  [phone]    Date:  2022    Initial Evaluation Date: 22                                Evaluating Therapist: Rosalina Moore OT     Patient Name:  Shi Nettles)                      :  1945     Restrictions/Precautions:  ROM, supination and extension focus, brace until 4 weeks post op, low fall risk  Diagnosis:  S52.122A (ICD-10-CM) - Closed displaced fracture of head of left radius, initial encounter                                    Date of Surgery: 8/3/22 sx ( 7 weeks post)     Insurance/Certification information:  Vaccn [employer]  Plan of care signed (Y/N): N  Visit# / total visits:      Referring Practitioner:  Rajni Craft MD  Specific Practitioner Orders: Left elbow rom, focus on supination and extension, wean from brace at 4 weeks post op, modalities prn, Scar desensitization and management. OT PLAN OF CARE   OT POC based on physician orders, patient diagnosis and results of clinical assessment     Frequency/Duration 2-3x a week for 18 sessions     Patient Specific Goal: to regain function in L arm to be able to go to [employer] in october                             GOALS (Long term same as Short term):  1) Patient will demonstrate good understanding of home program (exercises/activities/diagnosis/prognosis/goals) with good accuracy. 2) Patient will demonstrate increased active/passive range of motion of their LUE to [facility] for ADL/IADL completion to be able to increase completion in dressing tasks, socks and holding utensils to cut his food. 3) Patient will demonstrate increased /pinch strength of at least 10 / 5 pinch pounds of their L hand to be able to cut his lawn with the . 4) Patient to report decreased pain in their affected L upper extremity from 8/10 to 3/10 or less with resistive functional use.    5) Increase in fine motor function as evidenced by decreased time to complete 9-hole Wall extension/flexion stretch 2 x5 ^ x10 with therapist assist to avoid compensation at the shoulder and maintaining supination.   - Elbow extension stretch with red theraband x4 rounds with 20 sec holds. Scar Mass/Edema Control:     Soft tissue mobilization  10 min to increase tissue mobility and decrease muscle tension. Strengthening:               Other:     Oval 8 splint  Pt noted to have small deformity similar to a swan neck deformity starting on the MF. Splint issued to wear throughout the day on/off and at night. Compression sleeve x Size E issued for elbow and hand to decrease edema. Assessment/Comments: Pt is making Good progress toward stated plan of care. Pt tolerated session well with increased focus on functional tasks including supination and extension. Pt demonstrated improvement with supination by the end of the session and improved fine motor skills. Continue to progress as tolerated with measurement of strength at 8 weeks beginning with theraband strengthening.       -Rehab Potential: Good    Billing:    TIME IN: 2:00 pm        TIME OUT: 3:00 pm             CODE   TODAY MINUTES TODAY UNITS     55833 Fluidotherapy  15 1      44469 Manual       19431 Therapeutic Ex 25 2     61677 Therapeutic Activity 20 1     97066 ADL/COMP Tech Train       90762 Neuromuscular Re-Ed       46392 OrthoManagementTraining         Modality:mhp         Other                                     TOTAL   60 4                    Goals: Goals for pt can be seen on initial evaluation. Plan:   [x]  Continues Plan of care: Treatment covered based on POC and graduated to patient's progress. Pt education continues at each visit to obtain maximum benefits from skilled OT intervention.   []  Alter Plan of care:   []  Discharge:    Paola Ruano Ze 87, OTR/L #[id number]

## 2022-09-22 ENCOUNTER — TREATMENT (OUTPATIENT)
Dept: OCCUPATIONAL THERAPY | Age: 77
End: 2022-09-22
Payer: OTHER GOVERNMENT

## 2022-09-22 DIAGNOSIS — S52.122A CLOSED DISPLACED FRACTURE OF HEAD OF LEFT RADIUS, INITIAL ENCOUNTER: Primary | ICD-10-CM

## 2022-09-22 PROCEDURE — 97022 WHIRLPOOL THERAPY: CPT | Performed by: OCCUPATIONAL THERAPIST

## 2022-09-22 PROCEDURE — 97140 MANUAL THERAPY 1/> REGIONS: CPT | Performed by: OCCUPATIONAL THERAPIST

## 2022-09-22 PROCEDURE — 97110 THERAPEUTIC EXERCISES: CPT | Performed by: OCCUPATIONAL THERAPIST

## 2022-09-22 PROCEDURE — 97530 THERAPEUTIC ACTIVITIES: CPT | Performed by: OCCUPATIONAL THERAPIST

## 2022-09-22 NOTE — PROGRESS NOTES
peg test and/or MRMT test by at least 5-10 seconds. 6) Patient to report 100% compliance with their splint wear, care, and precautions if needed. 7) Patient will be knowledgeable of edema control techniques as evident with decreases from moderate to mild/none. 8) Patient will demonstrate a non-tender/non-adherent scar. 9) Patient will decrease QuickDASH score to 20% or less for increased participation in daily functional activities. Pain Level: 3/10 in the [facility] area with exercises, dull ache/sharp shooting. Subjective: Pt reports improvement with flexibility and steering. Objective:  Updated POC to be completed by 10th visit. INTERVENTION: COMPLETED: SPECIFICS/COMMENTS:   Modality:     MHP elbow  10 min to increase tissue mobility prior to motion. Fluidotherapy x 15 mins with AROM exercises completed for digits and wrist to decrease tightness in the forearm and elbow while monitored. AROM:     hand x Small peg activity 10 min  -Towel scrunches x10 + HEP  - in hand manipulation focusing on improving fine motor, functional grasp, and sup/pron motion. - boading balls clockwise and counter pronated and supinated  - Dealing cards and shuffling to improve functional use the hand and wrist.    wrist   x Ext/flex/rad/ulnar dev x10  Jux-A-cizer with elbow at side x3   - pron/sup activity with light resistance clothes pin to  pom pom and place into container.    True balance x5 mins   elbow X   Ext/flex/sup/pron x10 functional reaching   - supination towel stretch x5 + HEP  - Reaching shoulder height to place pegs in peg board for increased elbow extension/reaching.   - supination activities to  objects and remove objects from in front of him and at hip height  - arm Arc with elbow ext, sup/pron incorp.    shoulder  Flex/ext, abd  15x2   AAROM:     LUE  Ball AAROM in all planes of the wrist: flexion,extension, sup/pron, clock-wise x15  - cane elbow exercises to stretch in all planes x10 each. PROM/Stretching:     Wrist/  All planes light passive   elbow  All planes  light passive  within tolerance  - Wall extension/flexion stretch 2 x5 ^ x10 with therapist assist to avoid compensation at the shoulder and maintaining supination.   - Elbow extension stretch with red theraband x4 rounds with 20 sec holds. Scar Mass/Edema Control:     Soft tissue mobilization x 10 min to increase tissue mobility and decrease muscle tension. Strengthening:               Other:     Oval 8 splint  Pt noted to have small deformity similar to a swan neck deformity starting on the MF. Splint issued to wear throughout the day on/off and at night. Compression sleeve x Size E issued for elbow and hand to decrease edema. Assessment/Comments: Pt is making Good progress toward stated plan of care. Pt tolerated session well with continued focus on functional motion of the elbow and hand. Pt continues to demonstrate increased AROM of the LUE and continues to report slight pain through the forearm. Will continue to focus on decreasing pain with functional hand use and increasing fine motor skills. -Rehab Potential: Good    Billing:    TIME IN: 2:00 pm        TIME OUT: 3:00 pm             CODE   TODAY MINUTES TODAY UNITS     29086 Fluidotherapy  15 1      25496 Manual 10 1     79712 Therapeutic Ex 25 1     29769 Therapeutic Activity 10 1     41311 ADL/COMP Tech Train       44368 Neuromuscular Re-Ed       90011 OrthoManagementTraining         Modality:mhp         Other                                     TOTAL   60 4                    Goals: Goals for pt can be seen on initial evaluation. Plan:   [x]  Continues Plan of care: Treatment covered based on POC and graduated to patient's progress. Pt education continues at each visit to obtain maximum benefits from skilled OT intervention.   []  Alter Plan of care:   []  Discharge:    Paola Wagner Ze 87, OTR/L #[id number]

## 2022-09-27 ENCOUNTER — TREATMENT (OUTPATIENT)
Dept: OCCUPATIONAL THERAPY | Age: 77
End: 2022-09-27
Payer: OTHER GOVERNMENT

## 2022-09-27 DIAGNOSIS — S52.122A CLOSED DISPLACED FRACTURE OF HEAD OF LEFT RADIUS, INITIAL ENCOUNTER: Primary | ICD-10-CM

## 2022-09-27 PROCEDURE — 97530 THERAPEUTIC ACTIVITIES: CPT | Performed by: OCCUPATIONAL THERAPIST

## 2022-09-27 PROCEDURE — 97110 THERAPEUTIC EXERCISES: CPT | Performed by: OCCUPATIONAL THERAPIST

## 2022-09-27 PROCEDURE — 97140 MANUAL THERAPY 1/> REGIONS: CPT | Performed by: OCCUPATIONAL THERAPIST

## 2022-09-27 NOTE — PROGRESS NOTES
[address]  [phone]  *Measurement's Taken*    Date:  2022    Initial Evaluation Date: 22                                Evaluating Therapist: Javier Phillip OT     Patient Name:  Ondina Medina)                      :  1945     Restrictions/Precautions:  ROM, supination and extension focus, brace until 4 weeks post op, low fall risk  Diagnosis:  S52.122A (ICD-10-CM) - Closed displaced fracture of head of left radius, initial encounter                                    Date of Surgery: 8/3/22 sx ( 8 weeks post)     Insurance/Certification information:  Vaccn [employer]  Plan of care signed (Y/N): N  Visit# / total visits:      Referring Practitioner:  Derrick Alcazar MD  Specific Practitioner Orders: Left elbow rom, focus on supination and extension, wean from brace at 4 weeks post op, modalities prn, Scar desensitization and management. OT PLAN OF CARE   OT POC based on physician orders, patient diagnosis and results of clinical assessment     Frequency/Duration 2-3x a week for 18 sessions     Patient Specific Goal: to regain function in L arm to be able to go to [employer] in october                             GOALS (Long term same as Short term):  1) Patient will demonstrate good understanding of home program (exercises/activities/diagnosis/prognosis/goals) with good accuracy. 2) Patient will demonstrate increased active/passive range of motion of their LUE to [facility] for ADL/IADL completion to be able to increase completion in dressing tasks, socks and holding utensils to cut his food. 3) Patient will demonstrate increased /pinch strength of at least 10 / 5 pinch pounds of their L hand to be able to cut his lawn with the . 4) Patient to report decreased pain in their affected L upper extremity from 8/10 to 3/10 or less with resistive functional use.    5) Increase in fine motor function as evidenced by decreased time to complete 9-hole peg test and/or MRMT test by at least 5-10 seconds. 6) Patient to report 100% compliance with their splint wear, care, and precautions if needed. 7) Patient will be knowledgeable of edema control techniques as evident with decreases from moderate to mild/none. 8) Patient will demonstrate a non-tender/non-adherent scar. 9) Patient will decrease QuickDASH score to 20% or less for increased participation in daily functional activities. Pain Level: 6/10 at the wrist, sharp shooting when pushing it. Subjective: Pt reports improvement in the fingers however increased pain in the wrist with use or pushing off. Objective:  Updated POC to be completed by 10th visit. INTERVENTION: COMPLETED: SPECIFICS/COMMENTS:   Modality:     MHP elbow x 10 min to increase tissue mobility prior to motion. Paraffin x 5 mins for pain management to the hand and wrist at the end of the session. Fluidotherapy  15 mins with AROM exercises completed for digits and wrist to decrease tightness in the forearm and elbow while monitored. AROM:     hand x Small peg activity 10 min  -Towel scrunches x10 + HEP  - in hand manipulation focusing on improving fine motor, functional grasp, and sup/pron motion. - boading balls clockwise and counter pronated and supinated  - Dealing cards and shuffling to improve functional use the hand and wrist.    wrist   x Ext/flex/rad/ulnar dev x10  Jux-A-cizer with elbow at side x5   - wrist extension/flexion with ball in hand x15, wrist circumduction clock-wise and counter clock-wise x15.   - dart throwers motion x10  - pron/sup activity with light resistance clothes pin to  pom pom and place into container. True balance x5 mins   elbow  Ext/flex/sup/pron x10 functional reaching   - Elbow shoulder functional stretching using wall ladder x4 with stretching of the Biceps due to soreness reported.    - supination towel stretch x5 + HEP  - Reaching shoulder height to place pegs in peg board for increased elbow extension/reaching.   - supination activities to  objects and remove objects from in front of him and at hip height  - arm Arc with elbow ext, sup/pron incorp.    shoulder  Flex/ext, abd  15x2   AAROM:     LUE  Ball AAROM in all planes of the wrist: flexion,extension, sup/pron, clock-wise x15  - cane elbow exercises to stretch in all planes x10 each. PROM/Stretching:     Wrist/ x All planes light passive  - Light resistance web flexion/extension of the wrist x10    elbow  All planes  light passive  within tolerance  - Wall extension/flexion stretch 2 x5 ^ x10 with therapist assist to avoid compensation at the shoulder and maintaining supination.   - Elbow extension stretch with red theraband x4 rounds with 20 sec holds. Scar Mass/Edema Control:     Soft tissue mobilization x 10 min to increase tissue mobility and decrease muscle tension. Strengthening:               Other:     Oval 8 splint  Pt noted to have small deformity similar to a swan neck deformity starting on the MF. Splint issued to wear throughout the day on/off and at night. Compression sleeve x Size E issued for elbow and hand to decrease edema. Assessment/Comments: Pt is making Good progress toward stated plan of care. Pt tolerated session well with increased focus on wrist motion due to pain reported. Pt educated and issued exercises to complete at home to decrease wrist pain and increase motion. Trial of paraffin completed at the end of the session to assist with pain management with good results and no stiffness reported in the digits. Will assess strength next session and continue to focus on functional motion with strengthening as tolerated.      -Rehab Potential: Good    Billing:    TIME IN: 2:00 pm        TIME OUT: 3:00 pm             CODE   TODAY MINUTES TODAY UNITS     53139 Fluidotherapy       57353 Manual 10 1     66038 Therapeutic Ex 30 2     78816 Therapeutic Activity 10 1     62121 ADL/COMP Tech Train       [address] [place] Neuromuscular Re-Ed       [id number] OrthoManagementTraining         Modality:mhp         Other                                     TOTAL   50 3                    Goals: Goals for pt can be seen on initial evaluation. Plan:   [x]  Continues Plan of care: Treatment covered based on POC and graduated to patient's progress. Pt education continues at each visit to obtain maximum benefits from skilled OT intervention.   []  Alter Plan of care:   []  Discharge:    Paola Wagner Ze 87, OTR/L #[id number]

## 2022-09-29 ENCOUNTER — TREATMENT (OUTPATIENT)
Dept: OCCUPATIONAL THERAPY | Age: 77
End: 2022-09-29
Payer: OTHER GOVERNMENT

## 2022-09-29 DIAGNOSIS — S52.122A CLOSED DISPLACED FRACTURE OF HEAD OF LEFT RADIUS, INITIAL ENCOUNTER: Primary | ICD-10-CM

## 2022-09-29 PROCEDURE — 97022 WHIRLPOOL THERAPY: CPT | Performed by: OCCUPATIONAL THERAPIST

## 2022-09-29 PROCEDURE — 97110 THERAPEUTIC EXERCISES: CPT | Performed by: OCCUPATIONAL THERAPIST

## 2022-09-29 NOTE — PROGRESS NOTES
[address]  [phone]  *Measurement's Taken*    Date:  2022    Initial Evaluation Date: 22                                Evaluating Therapist: Sadie Diggs OT     Patient Name:  Leandra Delong)                      :  1945     Restrictions/Precautions:  ROM, supination and extension focus, brace until 4 weeks post op, low fall risk  Diagnosis:  S52.122A (ICD-10-CM) - Closed displaced fracture of head of left radius, initial encounter                                    Date of Surgery: 8/3/22 sx ( 8 weeks post)     Insurance/Certification information:  Vaccn [employer]  Plan of care signed (Y/N): N  Visit# / total visits:      Referring Practitioner:  Ivory Carmona MD  Specific Practitioner Orders: Left elbow rom, focus on supination and extension, wean from brace at 4 weeks post op, modalities prn, Scar desensitization and management. OT PLAN OF CARE   OT POC based on physician orders, patient diagnosis and results of clinical assessment     Frequency/Duration 2-3x a week for 18 sessions     Patient Specific Goal: to regain function in L arm to be able to go to [employer] in october                             GOALS (Long term same as Short term):  1) Patient will demonstrate good understanding of home program (exercises/activities/diagnosis/prognosis/goals) with good accuracy. 2) Patient will demonstrate increased active/passive range of motion of their LUE to [facility] for ADL/IADL completion to be able to increase completion in dressing tasks, socks and holding utensils to cut his food. 3) Patient will demonstrate increased /pinch strength of at least 10 / 5 pinch pounds of their L hand to be able to cut his lawn with the . 4) Patient to report decreased pain in their affected L upper extremity from 8/10 to 3/10 or less with resistive functional use.    5) Increase in fine motor function as evidenced by decreased time to complete 9-hole peg test and/or MRMT test by at least 5-10 seconds. 6) Patient to report 100% compliance with their splint wear, care, and precautions if needed. 7) Patient will be knowledgeable of edema control techniques as evident with decreases from moderate to mild/none. 8) Patient will demonstrate a non-tender/non-adherent scar. 9) Patient will decrease QuickDASH score to 20% or less for increased participation in daily functional activities. Pain Level:  4/10 in the wrist, dull ache, soreness in the forearm    Subjective: Pt reports improvement with flexibility of the wrist.      Objective:  Updated POC to be completed by 10th visit. INTERVENTION: COMPLETED: SPECIFICS/COMMENTS:   Modality:     MHP elbow  10 min to increase tissue mobility prior to motion. Paraffin  5 mins for pain management to the hand and wrist at the end of the session. Fluidotherapy x 15 mins with AROM exercises completed for digits and wrist to decrease tightness in the forearm and elbow while monitored. AROM:     hand x Small peg activity 10 min  -Towel scrunches x10 + HEP  - in hand manipulation focusing on improving fine motor, functional grasp, and sup/pron motion. - boading balls clockwise and counter pronated and supinated  - Dealing cards and shuffling to improve functional use the hand and wrist.    wrist   x Ext/flex/rad/ulnar dev x10  Jux-A-cizer with elbow at side x5   - wrist extension/flexion with ball in hand x15, supination/pronation x15, wrist circumduction clock-wise and counter clock-wise x15.   - dart throwers motion x10  - pron/sup activity with light resistance clothes pin to  pom pom and place into container. True balance x5 mins   elbow  Ext/flex/sup/pron x10 functional reaching   - Elbow shoulder functional stretching using wall ladder x4 with stretching of the Biceps due to soreness reported.    - supination towel stretch x5 + HEP  - Reaching shoulder height to place pegs in peg board for increased elbow extension/reaching.   - supination activities to  objects and remove objects from in front of him and at hip height  - arm Arc with elbow ext, sup/pron incorp.    shoulder  Flex/ext, abd  15x2   AAROM:     LUE  Ball AAROM in all planes of the wrist: flexion,extension, sup/pron, clock-wise x15  - cane elbow exercises to stretch in all planes x10 each. PROM/Stretching:     Wrist/ x All planes light passive  - Light resistance web flexion/extension of the wrist x10    elbow x Supination/pronation light passive  within tolerance  - Wall extension/flexion stretch 2 x5 ^ x10 with therapist assist to avoid compensation at the shoulder and maintaining supination.   - Elbow extension stretch with red theraband x4 rounds with 20 sec holds. Scar Mass/Edema Control:     Soft tissue mobilization  10 min to increase tissue mobility and decrease muscle tension. Strengthening:     LUE x Light theraputty manipulation x3 mins, pinching beads out of putty and placing into container. 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 Splint issued to wear throughout the day on/off and at night. Compression sleeve x Size E issued for elbow and hand to decrease edema. Assessment/Comments: Pt is making Good progress toward stated plan of care. Pt tolerated session well with  and pinch strength assessed with LUE demonstrating significant decreased strength. Initiated light theraputty manipulation with soreness reported in the forearm after 2-3 mins. Pt is reporting more soreness in the wrist and forearm with improvement after stretching & Modalities. Continue to progress strength as tolerated and decrease wrist and forearm pain.      Dynamometer (setting 2):     Left: 30#      Right: 85#       Pinch Meter:   Lateral: Left= 14#, Right= 20#    Palmar 3 point: Left= 6#, Right= 18#    -Rehab Potential: Good    Billing:    TIME IN: 3:00 pm        TIME OUT: 4:00 pm             CODE   TODAY MINUTES TODAY UNITS     80493 Fluidotherapy 15 1     61307 Manual       33409 Therapeutic Ex 45 3     16097 Therapeutic Activity       23458 ADL/COMP Tech Train       92349 Neuromuscular Re-Ed       05649 OrthoManagementTraining         Modality:mhp         Other                                     TOTAL   60 4                    Goals: Goals for pt can be seen on initial evaluation. Plan:   [x]  Continues Plan of care: Treatment covered based on POC and graduated to patient's progress. Pt education continues at each visit to obtain maximum benefits from skilled OT intervention.   []  Alter Plan of care:   []  Discharge:    Paola Merritt Ze 87, OTR/L #[id number]

## 2022-10-11 ENCOUNTER — TREATMENT (OUTPATIENT)
Dept: OCCUPATIONAL THERAPY | Age: 77
End: 2022-10-11
Payer: OTHER GOVERNMENT

## 2022-10-11 DIAGNOSIS — S52.122A CLOSED DISPLACED FRACTURE OF HEAD OF LEFT RADIUS, INITIAL ENCOUNTER: Primary | ICD-10-CM

## 2022-10-11 PROCEDURE — 97022 WHIRLPOOL THERAPY: CPT | Performed by: OCCUPATIONAL THERAPIST

## 2022-10-11 PROCEDURE — 97110 THERAPEUTIC EXERCISES: CPT | Performed by: OCCUPATIONAL THERAPIST

## 2022-10-11 NOTE — PROGRESS NOTES
[address]  [phone]      Date:  10/11/2022    Initial Evaluation Date: 22                                Evaluating Therapist: Daniel Clark OT     Patient Name:  Madi Lam Jeanne)                      :  1945     Restrictions/Precautions:  ROM, supination and extension focus, brace until 4 weeks post op, low fall risk  Diagnosis:  S52.122A (ICD-10-CM) - Closed displaced fracture of head of left radius, initial encounter                                    Date of Surgery: 8/3/22 sx ( 8 weeks post)     Insurance/Certification information:  [employer]  Plan of care signed (Y/N): N  Visit# / total visits: 10/ 18     Referring Practitioner:  Anastasia Angel MD  Specific Practitioner Orders: Left elbow rom, focus on supination and extension, wean from brace at 4 weeks post op, modalities prn, Scar desensitization and management. OT PLAN OF CARE   OT POC based on physician orders, patient diagnosis and results of clinical assessment     Frequency/Duration 2-3x a week for 18 sessions     Patient Specific Goal: to regain function in L arm to be able to go to [place] in october                             GOALS (Long term same as Short term):  1) Patient will demonstrate good understanding of home program (exercises/activities/diagnosis/prognosis/goals) with good accuracy. 2) Patient will demonstrate increased active/passive range of motion of their LUE to [facility] for ADL/IADL completion to be able to increase completion in dressing tasks, socks and holding utensils to cut his food. 3) Patient will demonstrate increased /pinch strength of at least 10 / 5 pinch pounds of their L hand to be able to cut his lawn with the . 4) Patient to report decreased pain in their affected L upper extremity from 8/10 to 3/10 or less with resistive functional use.    5) Increase in fine motor function as evidenced by decreased time to complete 9-hole peg test and/or MRMT test by at least 5-10 seconds. 6) Patient to report 100% compliance with their splint wear, care, and precautions if needed. 7) Patient will be knowledgeable of edema control techniques as evident with decreases from moderate to mild/none. 8) Patient will demonstrate a non-tender/non-adherent scar. 9) Patient will decrease QuickDASH score to 20% or less for increased participation in daily functional activities. Pain Level:  4/10 in the wrist, dull ache, soreness in the forearm    Subjective: Pt reports no new changes      Objective:  Updated POC to be completed by 10th visit. INTERVENTION: COMPLETED: SPECIFICS/COMMENTS:   Modality:     MHP elbow  10 min to increase tissue mobility prior to motion. Paraffin  5 mins for pain management to the hand and wrist at the end of the session. Fluidotherapy x 15 mins with AROM exercises completed for digits and wrist to decrease tightness in the forearm and elbow while monitored. AROM:     hand X    x Small peg activity 10 min  -Towel scrunches x10 + HEP  - in hand manipulation focusing on improving fine motor, functional grasp, and sup/pron motion. - boading balls clockwise and counter pronated and supinated  - Dealing cards and shuffling to improve functional use the hand and wrist.    wrist    Ext/flex/rad/ulnar dev x10  Jux-A-cizer with elbow at side x5   - wrist extension/flexion with ball in hand x15, supination/pronation x15, wrist circumduction clock-wise and counter clock-wise x15.   - dart throwers motion x10  - pron/sup activity with light resistance clothes pin to  pom pom and place into container. True balance x5 mins   elbow                 X        x Ext/flex/sup/pron x10 functional reaching   - Elbow shoulder functional stretching using wall ladder x4 with stretching of the Biceps due to soreness reported.    - supination towel stretch x5 + HEP  - Reaching shoulder height to place pegs in peg board for increased elbow extension/reaching.   - supination activities to  objects and remove objects from in front of him and at hip height  - arm Arc with elbow ext, sup/pron incorp.    shoulder  Flex/ext, abd  15x2   AAROM:     LUE  Ball AAROM in all planes of the wrist: flexion,extension, sup/pron, clock-wise x15  - cane elbow exercises to stretch in all planes x10 each. PROM/Stretching:     Wrist/ x All planes light passive  - Light resistance web flexion/extension of the wrist x10    elbow x Supination/pronation light passive  within tolerance  - Wall extension/flexion stretch 2 x5 ^ x10 with therapist assist to avoid compensation at the shoulder and maintaining supination.   - Elbow extension stretch with red theraband x4 rounds with 20 sec holds. Scar Mass/Edema Control:     Soft tissue mobilization x 10 min to increase tissue mobility and decrease muscle tension. Strengthening:     LUE X      x Light theraputty manipulation x3 mins, pinching beads out of putty and placing into container.  -supination and pronation therabar 10 reps 6#        Other:     Oval 8 splint  Pt noted to have small deformity similar to a swan neck deformity starting on the MF. Splint issued to wear throughout the day on/off and at night. Compression sleeve x Size E issued for elbow and hand to decrease edema. Assessment/Comments: Pt is making Good progress toward stated plan of care. Pt tolerated session well with increased tolerance to fine motor tasks and restive exercises. In hand manipulation task moderate difficulty due to lack of supination of the forearm.   Continue to progress strength as tolerated and decrease wrist and forearm pain.     -Rehab Potential: Good    Billing:    TIME IN: 2:00 pm        TIME OUT: 3:00 pm             CODE   TODAY MINUTES TODAY UNITS     06691 Fluidotherapy 15 1     53292 Manual       02040 Therapeutic Ex 45 3     84471 Therapeutic Activity       62959 ADL/COMP [employer] 18267 Neuromuscular Re-Ed       69666 OrthoManagementTraining         Modality:mhp         Other                                     TOTAL   60 4                    Goals: Goals for pt can be seen on initial evaluation. Plan:   [x]  Continues Plan of care: Treatment covered based on POC and graduated to patient's progress. Pt education continues at each visit to obtain maximum benefits from skilled OT intervention. []  Alter Plan of care:   []  Discharge:     [address] [facility], OTR/L #[id number]

## 2022-10-13 ENCOUNTER — TREATMENT (OUTPATIENT)
Dept: OCCUPATIONAL THERAPY | Age: 77
End: 2022-10-13
Payer: OTHER GOVERNMENT

## 2022-10-13 DIAGNOSIS — S52.122A CLOSED DISPLACED FRACTURE OF HEAD OF LEFT RADIUS, INITIAL ENCOUNTER: Primary | ICD-10-CM

## 2022-10-13 PROCEDURE — 97110 THERAPEUTIC EXERCISES: CPT | Performed by: OCCUPATIONAL THERAPIST

## 2022-10-13 NOTE — PROGRESS NOTES
[address]  [phone]      Date:  10/13/2022    Initial Evaluation Date: 22                                Evaluating Therapist: Rachele Prasad OT     Patient Name:  Greta Gaffney                      :  1945     Restrictions/Precautions:  ROM, supination and extension focus, brace until 4 weeks post op, low fall risk  Diagnosis:  S52.122A (ICD-10-CM) - Closed displaced fracture of head of left radius, initial encounter                                    Date of Surgery: 8/3/22 sx ( 8 weeks post)     Insurance/Certification information:  [address] [place] [address] signed (Y/N): N  Visit# / total visits:      Referring Practitioner:  Shiva Gage MD  Specific Practitioner Orders: Left elbow rom, focus on supination and extension, wean from brace at 4 weeks post op, modalities prn, Scar desensitization and management. OT PLAN OF CARE   OT POC based on physician orders, patient diagnosis and results of clinical assessment     Frequency/Duration 2-3x a week for 18 sessions     Patient Specific Goal: to regain function in L arm to be able to go to [place] in october                             GOALS (Long term same as Short term):  1) Patient will demonstrate good understanding of home program (exercises/activities/diagnosis/prognosis/goals) with good accuracy. 2) Patient will demonstrate increased active/passive range of motion of their LUE to [facility] for ADL/IADL completion to be able to increase completion in dressing tasks, socks and holding utensils to cut his food. 3) Patient will demonstrate increased /pinch strength of at least 10 / 5 pinch pounds of their L hand to be able to cut his lawn with the . 4) Patient to report decreased pain in their affected L upper extremity from 8/10 to 3/10 or less with resistive functional use.    5) Increase in fine motor function as evidenced by decreased time to complete 9-hole peg test and/or MRMT test by at least 5-10 seconds. 6) Patient to report 100% compliance with their splint wear, care, and precautions if needed. 7) Patient will be knowledgeable of edema control techniques as evident with decreases from moderate to mild/none. 8) Patient will demonstrate a non-tender/non-adherent scar. 9) Patient will decrease QuickDASH score to 20% or less for increased participation in daily functional activities. Pain Level:  no pain    Subjective: Pt reports improvement with supination. Objective:  Updated POC to be completed by 10th visit. INTERVENTION: COMPLETED: SPECIFICS/COMMENTS:   Modality:     MHP elbow x 10 min to increase tissue mobility prior to motion. Paraffin  5 mins for pain management to the hand and wrist at the end of the session. Fluidotherapy  15 mins with AROM exercises completed for digits and wrist to decrease tightness in the forearm and elbow while monitored. AROM:     hand     x Small peg activity 10 min  -Towel scrunches x10 + HEP  - in hand manipulation focusing on improving fine motor, functional grasp, and sup/pron motion. - boading balls clockwise and counter pronated and supinated  - Dealing cards and shuffling to improve functional use the hand and wrist.    wrist    Ext/flex/rad/ulnar dev x10  Jux-A-cizer with elbow at side x5   - wrist extension/flexion with ball in hand x15, supination/pronation x15, wrist circumduction clock-wise and counter clock-wise x15.   - dart throwers motion x10  - pron/sup activity with light resistance clothes pin to  pom pom and place into container. True balance x5 mins   elbow                          Ext/flex/sup/pron x10 functional reaching   - Elbow shoulder functional stretching using wall ladder x4 with stretching of the Biceps due to soreness reported.    - supination towel stretch x5 + HEP  - Reaching shoulder height to place pegs in peg board for increased elbow extension/reaching.   - supination activities to  objects and remove objects from in front of him and at hip height  - arm Arc with elbow ext, sup/pron incorp.    shoulder  Flex/ext, abd  15x2   AAROM:     LUE  Ball AAROM in all planes of the wrist: flexion,extension, sup/pron, clock-wise x15  - cane elbow exercises to stretch in all planes x10 each. PROM/Stretching:     Wrist/  All planes light passive  - Light resistance web flexion/extension of the wrist x10    elbow  Supination/pronation light passive within tolerance  - Wall extension/flexion stretch 2 x5 ^ x10 with therapist assist to avoid compensation at the shoulder and maintaining supination.   - Elbow extension stretch with red theraband x4 rounds with 20 sec holds. Scar Mass/Edema Control:     Soft tissue mobilization  10 min to increase tissue mobility and decrease muscle tension. Strengthening:     LUE       x Light theraputty manipulation x5 mins, pinching beads out of putty and placing into container.  -supination and pronation , twisting therabar 2x15 #10  - moderate resistance band x15 elbow flexion/extension     L wrist x 1# wrist weigth x15 flexion, extension, RD/UD  - 1# sup/pron bar x15, and x15 neutral to sup with 5 sec holds. 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 Splint issued to wear throughout the day on/off and at night. Compression sleeve x Size E issued for elbow and hand to decrease edema. Assessment/Comments: Pt is making Good progress toward stated plan of care. Pt tolerated session well with continued strengthening to the elbow, forearm and wrist.Pt demonstrated improvement with supination after resistance stretching completed.  Will issue program within the next two sessions with discharge anticipated at that time.     -Rehab Potential: Good    Billing:    TIME IN: 2:00 pm        TIME OUT: 3:00 pm             CODE   TODAY MINUTES TODAY UNITS [address] Manual       86373 Therapeutic Ex 50 3     65168 Therapeutic Activity       37950 ADL/COMP Tech Train       84784 Neuromuscular Re-Ed       50157 OrthoManagementTraining         Modality:mhp         Other                                     TOTAL   50 3                    Goals: Goals for pt can be seen on initial evaluation. Plan:   [x]  Continues Plan of care: Treatment covered based on POC and graduated to patient's progress. Pt education continues at each visit to obtain maximum benefits from skilled OT intervention.   []  Alter Plan of care:   []  Discharge:    Eden Arnold, OTR/L #[id number]

## 2022-10-18 ENCOUNTER — TREATMENT (OUTPATIENT)
Dept: OCCUPATIONAL THERAPY | Age: 77
End: 2022-10-18
Payer: OTHER GOVERNMENT

## 2022-10-18 DIAGNOSIS — S52.122A CLOSED DISPLACED FRACTURE OF HEAD OF LEFT RADIUS, INITIAL ENCOUNTER: Primary | ICD-10-CM

## 2022-10-18 PROCEDURE — 97022 WHIRLPOOL THERAPY: CPT | Performed by: OCCUPATIONAL THERAPIST

## 2022-10-18 PROCEDURE — 97110 THERAPEUTIC EXERCISES: CPT | Performed by: OCCUPATIONAL THERAPIST

## 2022-10-18 NOTE — PROGRESS NOTES
[address]  [phone]      Date:  10/18/2022    Initial Evaluation Date: 22                                Evaluating Therapist: Rachele Prasad OT     Patient Name:  Greta Gaffney                      :  1945     Restrictions/Precautions:  ROM, supination and extension focus, brace until 4 weeks post op, low fall risk  Diagnosis:  S52.122A (ICD-10-CM) - Closed displaced fracture of head of left radius, initial encounter                                    Date of Surgery: 8/3/22 sx ( 10 weeks post)     Insurance/Certification information:  [employer]  Plan of care signed (Y/N): N  Visit# / total visits:      Referring Practitioner:  Shiva Gage MD  Specific Practitioner Orders: Left elbow rom, focus on supination and extension, wean from brace at 4 weeks post op, modalities prn, Scar desensitization and management. OT PLAN OF CARE   OT POC based on physician orders, patient diagnosis and results of clinical assessment     Frequency/Duration 2-3x a week for 18 sessions     Patient Specific Goal: to regain function in L arm to be able to go to [employer] [facility] in october                             GOALS (Long term same as Short term):  1) Patient will demonstrate good understanding of home program (exercises/activities/diagnosis/prognosis/goals) with good accuracy. 2) Patient will demonstrate increased active/passive range of motion of their LUE to [facility] for ADL/IADL completion to be able to increase completion in dressing tasks, socks and holding utensils to cut his food. 3) Patient will demonstrate increased /pinch strength of at least 10 / 5 pinch pounds of their L hand to be able to cut his lawn with the . 4) Patient to report decreased pain in their affected L upper extremity from 8/10 to 3/10 or less with resistive functional use.    5) Increase in fine motor function as evidenced by decreased time to complete shoulder height to place pegs in peg board for increased elbow extension/reaching.   - supination activities to  objects and remove objects from in front of him and at hip height  - arm Arc with elbow ext, sup/pron incorp.    shoulder  Flex/ext, abd  15x2   AAROM:     LUE  Ball AAROM in all planes of the wrist: flexion,extension, sup/pron, clock-wise x15  - cane elbow exercises to stretch in all planes x10 each. PROM/Stretching:     Wrist/  All planes light passive  - Light resistance web flexion/extension of the wrist x10    elbow  Supination/pronation light passive within tolerance  - Wall extension/flexion stretch 2 x5 ^ x10 with therapist assist to avoid compensation at the shoulder and maintaining supination.   - Elbow extension stretch with red theraband x4 rounds with 20 sec holds. Scar Mass/Edema Control:     Soft tissue mobilization  10 min to increase tissue mobility and decrease muscle tension. Strengthening:     LUE       x Light theraputty manipulation x5 mins, pinching beads out of putty utilizing tweezers and placing into container completing supination. -supination and pronation , twisting therabar ^2 x10 #10  - moderate resistance band x15 elbow flexion/extension     L wrist x 1# wrist weigth x15 flexion, extension, RD/UD . Issued HEP and reviewed. - 6# sup/pron bar x15, and x15 neutral to sup with 5 sec holds. 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 Splint issued to wear throughout the day on/off and at night. Compression sleeve x Size E issued for elbow and hand to decrease edema. Assessment/Comments: Pt is making Good progress toward stated plan of care. Pt tolerated session well with continued strengthening to the elbow, forearm and wrist. Issued wrist HEP to be completed at discharge and focused on supination and functional use of the hand as well as  strength.  Anticipated discharge to be completed next session with continued HEP due to pt leaving for [place] for the winter.     -Rehab Potential: Good    Billing:    TIME IN: 1:00 pm        TIME OUT: 2:00 pm             CODE   TODAY MINUTES TODAY UNITS     84221 Fluidotherapy 10 1     32781 Manual       14013 Therapeutic Ex 50 3     02533 Therapeutic Activity       21525 ADL/COMP Tech Train       91717 Neuromuscular Re-Ed       89777 OrthoManagementTraining         Modality:mhp         Other                                     TOTAL   60 4                    Goals: Goals for pt can be seen on initial evaluation. Plan:   [x]  Continues Plan of care: Treatment covered based on POC and graduated to patient's progress. Pt education continues at each visit to obtain maximum benefits from skilled OT intervention.   []  Alter Plan of care:   []  Discharge:    Paola [address], OTR/L #[id number]

## 2022-10-20 ENCOUNTER — TREATMENT (OUTPATIENT)
Dept: OCCUPATIONAL THERAPY | Age: 77
End: 2022-10-20
Payer: OTHER GOVERNMENT

## 2022-10-20 DIAGNOSIS — S52.122A CLOSED DISPLACED FRACTURE OF HEAD OF LEFT RADIUS, INITIAL ENCOUNTER: Primary | ICD-10-CM

## 2022-10-20 PROCEDURE — 97022 WHIRLPOOL THERAPY: CPT | Performed by: OCCUPATIONAL THERAPIST

## 2022-10-20 PROCEDURE — 97110 THERAPEUTIC EXERCISES: CPT | Performed by: OCCUPATIONAL THERAPIST

## 2022-10-20 NOTE — PROGRESS NOTES
[address] [facility]  [phone]      Date:  10/20/2022    Initial Evaluation Date: 22                                Evaluating Therapist: lAba Dueñas OT     Patient Name:  Claudean Dyers Kizzie Spry)                      :  1945     Restrictions/Precautions:  ROM, supination and extension focus, brace until 4 weeks post op, low fall risk  Diagnosis:  S52.122A (ICD-10-CM) - Closed displaced fracture of head of left radius, initial encounter                                    Date of Surgery: 8/3/22 sx ( 10 weeks post)     Insurance/Certification information:  [employer]  Plan of care signed (Y/N): N  Visit# / total visits:      Referring Practitioner:  Yasmine Wood MD  Specific Practitioner Orders: Left elbow rom, focus on supination and extension, wean from brace at 4 weeks post op, modalities prn, Scar desensitization and management. OT PLAN OF CARE   OT POC based on physician orders, patient diagnosis and results of clinical assessment     Frequency/Duration 2-3x a week for 18 sessions     Patient Specific Goal: to regain function in L arm to be able to go to [place] in october                             GOALS (Long term same as Short term):  1) Patient will demonstrate good understanding of home program (exercises/activities/diagnosis/prognosis/goals) with good accuracy. Met,Pt demonstrates good carry over with HEP and will continue to work on strength and ROM with additional exercises added each session. 2) Patient will demonstrate increased active/passive range of motion of their LUE to [facility] for ADL/IADL completion to be able to increase completion in dressing tasks, socks and holding utensils to cut his food. Met, Pt demonstrates functional ROM within the elbow, wrist, and hand and is able to independently complete tasks without difficulty utilizing LUE.    3) Patient will demonstrate increased /pinch strength of at least 10 / 5 pinch pounds of their L hand to be able to cut his lawn with the . Met, Pt has increased  strength from 30# to 40# and lateral pinch 14# to 16# and 3 point from 6# to 13#  4) Patient to report decreased pain in their affected L upper extremity from 8/10 to 3/10 or less with resistive functional use. Met, Pt reports no pain except for end range stretching ( 2/10)  5) Increase in fine motor function as evidenced by decreased time to complete 9-hole peg test and/or MRMT test by at least 5-10 seconds. Met, Pt evaluated at normal ranges for his age. 6) Patient to report 100% compliance with their splint wear, care, and precautions if needed. Discharged, no splints required on discharge. 7) Patient will be knowledgeable of edema control techniques as evident with decreases from moderate to mild/none. Not Met, pt continues to demonstrate fluctuating edema however utilizes compression sleeve when needed and retrograde massage and demonstrates good understanding of these techniques as well as wearing isontoner glove. 8) Patient will demonstrate a non-tender/non-adherent scar. Met, no scar tissue noted around the incision site. 9) Patient will decrease QuickDASH score to 20% or less for increased participation in daily functional activities. Not met, Pt has improved from 80% disability to 29% disability since initial evaluation. Pain Level:  no pain reported except when completing supination end range stretch    Subjective: Pt reports decreased pain ( none) in his RF and SF since starting therapy. Objective:  Updated POC to be completed by 10th visit. INTERVENTION: COMPLETED: SPECIFICS/COMMENTS:   Modality:     MHP elbow  10 min to increase tissue mobility prior to motion. Paraffin  5 mins for pain management to the hand and wrist at the end of the session.     Fluidotherapy x 15 mins with AROM exercises completed for digits and wrist to decrease tightness in the forearm and elbow while monitored. AROM:     hand     x Small peg activity 10 min  -Towel scrunches x10 + HEP  - in hand manipulation focusing on improving fine motor, functional grasp, and sup/pron motion. - boading balls clockwise and counter pronated and supinated  - Dealing cards and shuffling to improve functional use the hand and wrist. Pt then completed supination to flip cards as well.     wrist    Ext/flex/rad/ulnar dev x10  Jux-A-cizer with elbow at side x5   - wrist extension/flexion with ball in hand x15, supination/pronation x15, wrist circumduction clock-wise and counter clock-wise x15.   - dart throwers motion x10  - pron/sup activity with light resistance clothes pin to  pom pom and place into container. True balance x5 mins   elbow                          Ext/flex/sup/pron x10 functional reaching   - Elbow shoulder functional stretching using wall ladder x4 with stretching of the Biceps due to soreness reported. - supination towel stretch x5 + HEP  - Reaching shoulder height to place pegs in peg board for increased elbow extension/reaching.   - supination activities to  objects and remove objects from in front of him and at hip height  - arm Arc with elbow ext, sup/pron incorp.    shoulder  Flex/ext, abd  15x2   AAROM:     LUE  Ball AAROM in all planes of the wrist: flexion,extension, sup/pron, clock-wise x15  - cane elbow exercises to stretch in all planes x10 each. PROM/Stretching:     Wrist/  All planes light passive  - Light resistance web flexion/extension of the wrist x10    elbow  Supination/pronation light passive within tolerance  - Wall extension/flexion stretch 2 x5 ^ x10 with therapist assist to avoid compensation at the shoulder and maintaining supination.   - Elbow extension stretch with red theraband x4 rounds with 20 sec holds. Scar Mass/Edema Control:     Soft tissue mobilization  10 min to increase tissue mobility and decrease muscle tension.          Strengthening: LUE       x Light theraputty manipulation x5 mins, pinching beads out of putty utilizing tweezers and placing into container completing supination. -supination and pronation , twisting therabar ^2 x10 #10  - moderate resistance band x15 elbow flexion/extension   - moderate resistance sponge issued and exercises completed to increase  strength : grasping, tip to tip pinch, 3 point pinch,  and hold 3 seconds. X15 each     L wrist  1# wrist weigth x15 flexion, extension, RD/UD . Issued HEP and reviewed. - 6# sup/pron bar x15, and x15 neutral to sup with 5 sec holds. 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 Splint issued to wear throughout the day on/off and at night. Compression sleeve x Size E issued for elbow and hand to decrease edema. Assessment/Comments: Pt is making Good progress toward stated plan of care. Pt tolerated session well with  strength program issued with good carryover of each exercise. Pt demonstrate improvement with ROM, and strength and was issued HEP to continue to work on supination, wrist ROM and /pinch strength. Pt demonstrates good understanding of edema management techniques and has compression sleeves and Isotoner glove to assist as well as good carry over of retrograde massage. Pt reports independence using LUE to touch the back of his head and complete ADL/IADL tasks such as packing, yard work and no difficulty with dressing. Pt did not meet all established goals however will continue with HEP while in [place] in all areas where goals were not met. Recommend D/C to HEP at this time.      Left Upper Extremity ROM          IE  10/20  ELBOW Flexion 0-150* 110  138*         Extension 0* -25  -14*          FOREARM Pronation 80-90* NT  NT         Supination 80-90* neutral  66*          WRIST Flexion 0-80* 50  60*         Extension 0-70* 30  44*         Radial Deviation 0-20* 20  24* Ulnar Deviation 0-30* 15  22*          Pt unable to make a full composite fist, 2.5 cm away from [facility] ( full composite fist)  Pt does demonstrate full opposition from thumb to SF. (opposition)     Comment: Hand Dominance is right     Sensation: slight tingling noted in the dorsal aspect of the forearm, pt states it is intermittent      Edema Description/Circumferential Measurements:              Moderate edema from hand to forearm, 24 cm circumferential around MCPs of the L hand, 22.5 cm, 19.5 cm circumferential around the L wrist, 20 cm           QuickDASH Score: 82% disability  Discharge QuickDASH: 29% disability      Dynamometer (setting 2):                              Left: 30#, 40#                                               Right: 85#                                        Pinch Meter:              Lateral: Left= 14#,16#         Right= 20#               Palmar 3 point: Left= 6#,13#   Right= 18#    9 Hole Peg Test:   Left: 25 sec   Right: 22 sec    -Rehab Potential: Good    Billing:    TIME IN: 2:00 pm        TIME OUT: 2:55 pm             CODE   TODAY MINUTES TODAY UNITS     41264 Fluidotherapy 10 1     45717 Manual       32655 Therapeutic Ex 45 3     85667 Therapeutic Activity       [id number] ADL/COMP Tech Train       [id number] Neuromuscular Re-Ed       [id number] OrthoManagementTraining         Modality:mhp         Other                                     TOTAL   55 4                    Goals: Goals for pt can be seen on initial evaluation. Plan:   []  Continues Plan of care: Treatment covered based on POC and graduated to patient's progress. Pt education continues at each visit to obtain maximum benefits from skilled OT intervention. []  Alter Plan of care:   [x]  Discharge: from skilled OT services due to returning to [place] for the winter.      Paola Rosen [address] 87, OTR/L #[id number]

## (undated) DEVICE — COVER,BOOT,FOAM,NON-SKID,HOOK-LOOP,XLG: Brand: MEDLINE INDUSTRIES, INC.

## (undated) DEVICE — 4-PORT MANIFOLD: Brand: NEPTUNE 2

## (undated) DEVICE — INTENDED FOR TISSUE SEPARATION, AND OTHER PROCEDURES THAT REQUIRE A SHARP SURGICAL BLADE TO PUNCTURE OR CUT.: Brand: BARD-PARKER ® STAINLESS STEEL BLADES

## (undated) DEVICE — C-ARM: Brand: UNBRANDED

## (undated) DEVICE — COVER HNDL LT DISP

## (undated) DEVICE — PADDING,UNDERCAST,COTTON, 4"X4YD STERILE: Brand: MEDLINE

## (undated) DEVICE — BNDG,ELSTC,MATRIX,STRL,3"X5YD,LF,HOOK&LP: Brand: MEDLINE

## (undated) DEVICE — PRECISION THIN (9.0 X 0.38 X 25.0MM)

## (undated) DEVICE — TOWEL,OR,DSP,ST,BLUE,STD,6/PK,12PK/CS: Brand: MEDLINE

## (undated) DEVICE — MASTISOL ADHESIVE LIQ 2/3ML

## (undated) DEVICE — 3M™ STERI-DRAPE™ U-DRAPE 1015: Brand: STERI-DRAPE™

## (undated) DEVICE — GLOVE SURG SZ 6 THK91MIL LTX FREE SYN POLYISOPRENE ANTI

## (undated) DEVICE — GLOVE SURG SZ 85 L12IN FNGR THK13MIL WHT ISOLEX POLYISOPRENE

## (undated) DEVICE — SYRINGE IRRIG 60ML SFT PLIABLE BLB EZ TO GRP 1 HND USE W/

## (undated) DEVICE — BNDG,ELSTC,MATRIX,STRL,4"X5YD,LF,HOOK&LP: Brand: MEDLINE

## (undated) DEVICE — SPONGE LAP W18XL18IN WHT COT 4 PLY FLD STRUNG RADPQ DISP ST

## (undated) DEVICE — GOWN,SIRUS,FABRNF,XL,20/CS: Brand: MEDLINE

## (undated) DEVICE — DRAPE,U/ SHT,SPLIT,PLAS,STERIL: Brand: MEDLINE

## (undated) DEVICE — GAUZE,SPONGE,4"X4",8PLY,STRL,LF,10/TRAY: Brand: MEDLINE

## (undated) DEVICE — GLOVE SURG SZ 9 THK91MIL LTX FREE SYN POLYISOPRENE ANTI

## (undated) DEVICE — PADDING,UNDERCAST,COTTON, 3X4YD STERILE: Brand: MEDLINE

## (undated) DEVICE — GLOVE SURG SZ 65 L12IN FNGR THK79MIL GRN LTX FREE

## (undated) DEVICE — LOOP VES W25MM THK1MM MAXI RED SIL FLD REPELLENT 100 PER

## (undated) DEVICE — DOUBLE BASIN SET: Brand: MEDLINE INDUSTRIES, INC.

## (undated) DEVICE — Device

## (undated) DEVICE — BLADE CLIPPER GEN PURP NS

## (undated) DEVICE — ELECTRODE PT RET AD L9FT HI MOIST COND ADH HYDRGEL CORDED

## (undated) DEVICE — ADHESIVE SKIN CLSR 0.7ML TOP DERMBND ADV

## (undated) DEVICE — SOLUTION IV IRRIG POUR BRL 0.9% SODIUM CHL 2F7124

## (undated) DEVICE — SURGICAL PROCEDURE PACK HND

## (undated) DEVICE — PADDING UNDERCAST W4INXL4YD COT FBR LO LINTING WYTEX